# Patient Record
Sex: MALE | URBAN - METROPOLITAN AREA
[De-identification: names, ages, dates, MRNs, and addresses within clinical notes are randomized per-mention and may not be internally consistent; named-entity substitution may affect disease eponyms.]

---

## 2020-01-17 ENCOUNTER — OFFICE VISIT (OUTPATIENT)
Dept: FAMILY MEDICINE CLINIC | Age: 65
End: 2020-01-17

## 2020-01-17 VITALS
HEART RATE: 85 BPM | RESPIRATION RATE: 18 BRPM | WEIGHT: 249 LBS | DIASTOLIC BLOOD PRESSURE: 88 MMHG | TEMPERATURE: 97.2 F | SYSTOLIC BLOOD PRESSURE: 148 MMHG | BODY MASS INDEX: 30.96 KG/M2 | HEIGHT: 75 IN | OXYGEN SATURATION: 96 %

## 2020-01-17 DIAGNOSIS — I10 ESSENTIAL HYPERTENSION: ICD-10-CM

## 2020-01-17 DIAGNOSIS — E78.2 MIXED HYPERLIPIDEMIA: ICD-10-CM

## 2020-01-17 DIAGNOSIS — J45.20 MILD INTERMITTENT ASTHMA WITHOUT COMPLICATION: Primary | ICD-10-CM

## 2020-01-17 DIAGNOSIS — R01.1 MURMUR, CARDIAC: ICD-10-CM

## 2020-01-17 DIAGNOSIS — E66.9 OBESITY (BMI 30-39.9): ICD-10-CM

## 2020-01-17 RX ORDER — FLUTICASONE PROPIONATE 50 MCG
SPRAY, SUSPENSION (ML) NASAL
Qty: 3 BOTTLE | Refills: 3 | Status: SHIPPED | OUTPATIENT
Start: 2020-01-17 | End: 2021-03-04 | Stop reason: SDUPTHER

## 2020-01-17 NOTE — PATIENT INSTRUCTIONS
Please call [facility] at [phone] if they do not  Call you by Monday to schedule the heart ultrasound test. 
  
 
 
 
DASH Diet: Care Instructions Your Care Instructions The DASH diet is an eating plan that can help lower your blood pressure. DASH stands for Dietary Approaches to Stop Hypertension. Hypertension is high blood pressure. The DASH diet focuses on eating foods that are high in calcium, potassium, and magnesium. These nutrients can lower blood pressure. The foods that are highest in these nutrients are fruits, vegetables, low-fat dairy products, nuts, seeds, and legumes. But taking calcium, potassium, and magnesium supplements instead of eating foods that are high in those nutrients does not have the same effect. The DASH diet also includes whole grains, fish, and poultry. The DASH diet is one of several lifestyle changes your doctor may recommend to lower your high blood pressure. Your doctor may also want you to decrease the amount of sodium in your diet. Lowering sodium while following the DASH diet can lower blood pressure even further than just the DASH diet alone. Follow-up care is a key part of your treatment and safety. Be sure to make and go to all appointments, and call your doctor if you are having problems. It's also a good idea to know your test results and keep a list of the medicines you take. How can you care for yourself at home? Following the DASH diet · Eat 4 to 5 servings of fruit each day. A serving is 1 medium-sized piece of fruit, ½ cup chopped or canned fruit, 1/4 cup dried fruit, or 4 ounces (½ cup) of fruit juice. Choose fruit more often than fruit juice. · Eat 4 to 5 servings of vegetables each day. A serving is 1 cup of lettuce or raw leafy vegetables, ½ cup of chopped or cooked vegetables, or 4 ounces (½ cup) of vegetable juice. Choose vegetables more often than vegetable juice. · Get 2 to 3 servings of low-fat and fat-free dairy each day.  A serving is 8 ounces of milk, 1 cup of yogurt, or 1 ½ ounces of cheese. · Eat 6 to 8 servings of grains each day. A serving is 1 slice of bread, 1 ounce of dry cereal, or ½ cup of cooked rice, pasta, or cooked cereal. Try to choose whole-grain products as much as possible. · Limit lean meat, poultry, and fish to 2 servings each day. A serving is 3 ounces, about the size of a deck of cards. · Eat 4 to 5 servings of nuts, seeds, and legumes (cooked dried beans, lentils, and split peas) each week. A serving is 1/3 cup of nuts, 2 tablespoons of seeds, or ½ cup of cooked beans or peas. · Limit fats and oils to 2 to 3 servings each day. A serving is 1 teaspoon of vegetable oil or 2 tablespoons of salad dressing. · Limit sweets and added sugars to 5 servings or less a week. A serving is 1 tablespoon jelly or jam, ½ cup sorbet, or 1 cup of lemonade. · Eat less than 2,300 milligrams (mg) of sodium a day. If you limit your sodium to 1,500 mg a day, you can lower your blood pressure even more. Tips for success · Start small. Do not try to make dramatic changes to your diet all at once. You might feel that you are missing out on your favorite foods and then be more likely to not follow the plan. Make small changes, and stick with them. Once those changes become habit, add a few more changes. · Try some of the following: ? Make it a goal to eat a fruit or vegetable at every meal and at snacks. This will make it easy to get the recommended amount of fruits and vegetables each day. ? Try yogurt topped with fruit and nuts for a snack or healthy dessert. ? Add lettuce, tomato, cucumber, and onion to sandwiches. ? Combine a ready-made pizza crust with low-fat mozzarella cheese and lots of vegetable toppings. Try using tomatoes, squash, spinach, broccoli, carrots, cauliflower, and onions. ? Have a variety of cut-up vegetables with a low-fat dip as an appetizer instead of chips and dip. ? Sprinkle sunflower seeds or chopped almonds over salads. Or try adding chopped walnuts or almonds to cooked vegetables. ? Try some vegetarian meals using beans and peas. Add garbanzo or kidney beans to salads. Make burritos and tacos with mashed nguyễn beans or black beans. Where can you learn more? Go to http://jo ann-gavin.info/. Enter P468 in the search box to learn more about \"DASH Diet: Care Instructions. \" Current as of: April 9, 2019 Content Version: 12.2 © 7579-0194 [employer]. Care instructions adapted under license by [employer] (which disclaims liability or warranty for this information). If you have questions about a medical condition or this instruction, always ask your healthcare professional. [address] any warranty or liability for your use of this information.

## 2020-01-17 NOTE — PROGRESS NOTES
Kayce Antonio is a 59 y.o.  male and presents with New Patient       Subjective:    Asthma- seeing Dr. Nyla Rios  Hyperlipidemia- on simvastatin- watches diet. No myalagia or abd pain.  htn-taking all meds- no h/o MI/renal injury or CVA. Obesity- gained wt on steroids. Pt is currently losing weight. Assessment/Plan:    Asthma- seeing Dr. Nyla Rios with [facility]- continue f/u with pulmonary  Hyperlipidemia-cont statin- 6/3/19.   htn- mildly elevated- will hold on changing meds- discussed lifestyle and wt loss. Elevated PSA and nodule- keep urology f/u. Obesity- encouraged wt loss with caloric restriction to BMI under 25 at 1-2 lbs per week. Colon polyp- due for next colo in 2021-last was 8/6/15- Dr. Samm Dinaa. Allergies- needs refill of flonase- needs this year round. Discussed potential steroid effects. Diagnoses and all orders for this visit:    1. Mild intermittent asthma without complication  Comments:  seeing pulmonary- Dr. Nyla Rios    2. Mixed hyperlipidemia    3. Essential hypertension    4. Murmur, cardiac  -     ECHO ADULT COMPLETE; Future    5. Obesity (BMI 30-39. 9)    Other orders  -     fluticasone propionate (FLONASE) 50 mcg/actuation nasal spray; 2 sprays each nostril daily          ROS:  Constitutional: No recent weight change. No weakness/fatigue. No f/c. Skin: No rashes, change in nails/hair, itching   HENT: No HA, dizziness. No hearing loss/tinnitus. No nasal congestion/discharge. Eyes: No change in vision, double/blurred vision or eye pain/redness. Cardiovascular: No CP/palpitations. No LOBO/orthopnea/PND. Respiratory: No cough/sputum, dyspnea, wheezing. Gastointestinal: No dysphagia, reflux. No n/v. No constipation/diarrhea. No melena/rectal bleeding. Genitourinary: No dysuria, urinary hesitancy, nocturia, hematuria. No incontinence. Musculoskeletal: No joint pain/stiffness. No muscle pain/tenderness. Endo: No heat/cold intolerance, no polyuria/polydypsia. Heme: No h/o anemia. No easy bleeding/bruising. Allergy/Immunology: No seasonal rhinitis. Denies frequent colds, sinus/ear infections. Neurological: No seizures/numbness/weakness. No paresthesias. Psychiatric:  No depression, anxiety. PMH:  Past Medical History:   Diagnosis Date    Allergy     Asthma     Burning with urination     Colon polyp     Elevated PSA     Essential hypertension     Hypertension     Obesity, unspecified     Prostate nodule        Patient Active Problem List   Diagnosis Code    Elevated prostate specific antigen (PSA) R97.20    Nocturia R35.1    Prostate nodule N40.2    Epididymo-orchitis N45.3    Mild intermittent asthma without complication U77.84    Mixed hyperlipidemia E78.2       PSH:  Past Surgical History:   Procedure Laterality Date    HX OTHER SURGICAL  2011    Prostate Biopsy- Dr. Teresa Lemon  ? Prostate biopsy- Dr. Sorin Hilliard        SH:  Social History     Tobacco Use    Smoking status: Never Smoker    Smokeless tobacco: Never Used   Substance Use Topics    Alcohol use: Not Currently     Frequency: Never    Drug use: No       FH:  Family History   Problem Relation Age of Onset    Heart Disease Mother     Diabetes Father     Asthma Sister        Medications/Allergies:    Current Outpatient Medications:     albuterol (PROAIR HFA) 90 mcg/actuation inhaler, Take 180 mcg by inhalation daily. , Disp: , Rfl:     tiZANidine (ZANAFLEX) 2 mg tablet, as needed. , Disp: , Rfl: 1    tamsulosin (FLOMAX) 0.4 mg capsule, Take 1 Cap by mouth daily (after dinner). , Disp: 90 Cap, Rfl: 3    dutasteride (AVODART) 0.5 mg capsule, Take 1 Cap by mouth daily (after dinner). , Disp: 90 Cap, Rfl: 3    fluticasone (FLONASE) 50 mcg/actuation nasal spray, , Disp: , Rfl: 0    zolpidem (AMBIEN) 5 mg tablet, Take 5 mg by mouth nightly as needed. , Disp: , Rfl: 0    amLODIPine (NORVASC) 5 mg tablet, Take 5 mg by mouth daily.   , Disp: , Rfl:     Azelastine (ASTEPRO) 0.15 % (205.5 mcg) nasal spray, two (2) times a day.  , Disp: , Rfl:     Calcium-Cholecalciferol, D3, 500 mg(1,250mg) -400 unit Tab, Take  by mouth daily. , Disp: , Rfl:     hydrochlorothiazide (HYDRODIURIL) 25 mg tablet, Take 25 mg by mouth daily. , Disp: , Rfl:     omeprazole (PRILOSEC) 20 mg capsule, Take 20 mg by mouth daily. , Disp: , Rfl:     simvastatin (ZOCOR) 10 mg tablet, Take  by mouth nightly.  , Disp: , Rfl:     tiotropium (SPIRIVA WITH HANDIHALER) 18 mcg inhalation capsule, Take 1 Cap by inhalation daily. , Disp: , Rfl:     montelukast (SINGULAIR) 10 mg tablet, Take 10 mg by mouth daily. , Disp: , Rfl:     Allergies   Allergen Reactions    Ace Inhibitors Swelling    Lisinopril Swelling       Objective:  Visit Vitals  /88   Pulse 85   Temp 97.2 °F (36.2 °C) (Oral)   Resp 18   Ht 6' 3\" (1.905 m)   Wt 249 lb (112.9 kg)   SpO2 96%   BMI 31.12 kg/m²    Body mass index is 31.12 kg/m². Constitutional: Well developed, nourished, no distress, alert   HENT: Exterior ears and tympanic membranes normal bilaterally. Supple neck. No thyromegaly or lymphadenopathy. Oropharynx clear and moist mucous membranes. Eyes: Conjunctiva normal. PERRL. Cardiovascular: S1, S2.  RRR. No murmurs/rubs. No thrills palpated. No carotid bruits. Intact distal pulses. No edema. Pulmonary/Chest Wall: No abnormalities on inspection. Clear to auscultation bilaterally. No wheezing/rhonchi. Normal effort. GI: Soft, nontender, nondistended. Normal active bowel sounds. No  masses on palpation. No hepatosplenomegaly. Musculoskeletal: Gait normal.  Joints without deformity/tenderness. Strength intact bilateral upper and lower ext. Normal ROM all extremities. Neurological: Appropriate. 2+DTR. No focal motor or sensory deficits. Speech normal.   Skin: No lesions/rashes on inspection. Psych: Appropriate affect, judgement and insight. Short-term memory intact.                Health Maintenance:   Health Maintenance   Topic Date Due    Hepatitis C Screening  1955    FOBT Q 1 YEAR AGE 50-75  03/19/2005    Shingrix Vaccine Age 50> (2 of 2) 11/05/2018    Influenza Age 5 to Adult  08/01/2019    DTaP/Tdap/Td series (2 - Td) 09/10/2028    Pneumococcal 0-64 years  Aged Out       No orders of the defined types were placed in this encounter.

## 2020-04-01 DIAGNOSIS — R01.1 MURMUR, CARDIAC: ICD-10-CM

## 2020-05-27 RX ORDER — AMLODIPINE BESYLATE 5 MG/1
5 TABLET ORAL DAILY
Qty: 90 TAB | Refills: 1 | Status: SHIPPED | OUTPATIENT
Start: 2020-05-27 | End: 2020-11-04

## 2020-05-27 RX ORDER — HYDROCHLOROTHIAZIDE 25 MG/1
25 TABLET ORAL DAILY
Qty: 90 TAB | Refills: 1 | Status: SHIPPED | OUTPATIENT
Start: 2020-05-27 | End: 2020-11-04

## 2020-05-27 RX ORDER — OMEPRAZOLE 20 MG/1
20 CAPSULE, DELAYED RELEASE ORAL DAILY
Qty: 90 CAP | Refills: 1 | Status: SHIPPED | OUTPATIENT
Start: 2020-05-27 | End: 2020-11-04

## 2020-06-03 RX ORDER — FLUTICASONE PROPIONATE AND SALMETEROL 500; 50 UG/1; UG/1
1 POWDER RESPIRATORY (INHALATION) EVERY 12 HOURS
Qty: 1 INHALER | Refills: 2 | Status: SHIPPED | OUTPATIENT
Start: 2020-06-03 | End: 2020-09-09

## 2020-06-03 RX ORDER — AZELASTINE HCL 205.5 UG/1
1 SPRAY NASAL 2 TIMES DAILY
Qty: 1 BOTTLE | Refills: 2 | Status: SHIPPED | OUTPATIENT
Start: 2020-06-03 | End: 2021-07-22 | Stop reason: SDUPTHER

## 2020-06-03 RX ORDER — MONTELUKAST SODIUM 10 MG/1
10 TABLET ORAL DAILY
Qty: 30 TAB | Refills: 2 | Status: SHIPPED | OUTPATIENT
Start: 2020-06-03

## 2020-07-15 ENCOUNTER — VIRTUAL VISIT (OUTPATIENT)
Dept: FAMILY MEDICINE CLINIC | Age: 65
End: 2020-07-15

## 2020-07-15 DIAGNOSIS — Z91.199 NO-SHOW FOR APPOINTMENT: Primary | ICD-10-CM

## 2020-07-20 ENCOUNTER — VIRTUAL VISIT (OUTPATIENT)
Dept: FAMILY MEDICINE CLINIC | Age: 65
End: 2020-07-20

## 2020-07-20 DIAGNOSIS — J45.20 MILD INTERMITTENT ASTHMA WITHOUT COMPLICATION: Primary | ICD-10-CM

## 2020-07-20 RX ORDER — ALBUTEROL SULFATE 90 UG/1
2 AEROSOL, METERED RESPIRATORY (INHALATION) DAILY
Qty: 1 INHALER | Refills: 2 | Status: CANCELLED | OUTPATIENT
Start: 2020-07-20

## 2020-07-20 RX ORDER — SIMVASTATIN 10 MG/1
10 TABLET, FILM COATED ORAL
Qty: 90 TAB | Refills: 2 | Status: CANCELLED | OUTPATIENT
Start: 2020-07-20

## 2020-07-20 NOTE — PROGRESS NOTES
1. Have you been to the ER, urgent care clinic since your last visit? Hospitalized since your last visit? No.     2. Have you seen or consulted any other health care providers outside of the [address] [place] [address] since your last visit? Include any pap smears or colon screening.  No.    Chief Complaint   Patient presents with   Nick Philippe Cholesterol Problem

## 2020-08-12 RX ORDER — SIMVASTATIN 10 MG/1
TABLET, FILM COATED ORAL
OUTPATIENT
Start: 2020-08-12

## 2020-08-12 NOTE — TELEPHONE ENCOUNTER
Requested Prescriptions     Pending Prescriptions Disp Refills    simvastatin (ZOCOR) 10 mg tablet        Sig: Take  by mouth nightly.

## 2020-09-09 RX ORDER — FLUTICASONE PROPIONATE AND SALMETEROL 500; 50 UG/1; UG/1
POWDER RESPIRATORY (INHALATION)
Qty: 60 EACH | Refills: 0 | Status: SHIPPED | OUTPATIENT
Start: 2020-09-09

## 2020-10-12 ENCOUNTER — VIRTUAL VISIT (OUTPATIENT)
Dept: FAMILY MEDICINE CLINIC | Age: 65
End: 2020-10-12

## 2020-10-12 DIAGNOSIS — R97.20 ELEVATED PROSTATE SPECIFIC ANTIGEN (PSA): ICD-10-CM

## 2020-10-12 DIAGNOSIS — J45.20 MILD INTERMITTENT ASTHMA WITHOUT COMPLICATION: Primary | ICD-10-CM

## 2020-10-12 DIAGNOSIS — E78.2 MIXED HYPERLIPIDEMIA: ICD-10-CM

## 2020-10-12 DIAGNOSIS — E66.9 OBESITY (BMI 30-39.9): ICD-10-CM

## 2020-10-12 DIAGNOSIS — I10 ESSENTIAL HYPERTENSION: ICD-10-CM

## 2020-10-12 PROCEDURE — 99443 PR PHYS/QHP TELEPHONE EVALUATION 21-30 MIN: CPT | Performed by: INTERNAL MEDICINE

## 2020-10-12 NOTE — PROGRESS NOTES
1. Have you been to the ER, urgent care clinic since your last visit? Hospitalized since your last visit? No    2. Have you seen or consulted any other health care providers outside of the [address] [place] [address] since your last visit? Include any pap smears or colon screening.  No     Chief Complaint   Patient presents with    Follow Up Chronic Condition    Hypertension    Asthma    Cholesterol Problem

## 2020-10-12 NOTE — PROGRESS NOTES
Anupama Frias is a 72 y.o. male, evaluated via audio-only technology on 10/12/2020 for Follow Up Chronic Condition; Hypertension; Asthma; and Cholesterol Problem  . Assessment & Plan:   Diagnoses and all orders for this visit:    1. Mild intermittent asthma without complication  -     CBC W/O DIFF; Future    2. Mixed hyperlipidemia  -     METABOLIC PANEL, COMPREHENSIVE; Future  -     LIPID PANEL; Future  -     TSH 3RD GENERATION; Future    3. Essential hypertension  -     CBC W/O DIFF; Future    4. Obesity (BMI 30-39.9)  -     CBC W/O DIFF; Future    5. Elevated prostate specific antigen (PSA)         Asthma- seeing Dr. Marcelo Eid with [facility]- continue f/u with pulmonary  Hyperlipidemia-cont statin- 6/3/19. On simvastatin.  htn- mildly elevated- will hold on changing meds- discussed lifestyle and wt loss. Elevated PSA and nodule- keep urology f/u. Obesity- encouraged wt loss with caloric restriction to BMI under 25 at 1-2 lbs per week. Colon polyp- due for next colo in 2021-last was 8/6/15- Dr. Bhakti Lopez. Allergies- needs refill of flonase- needs this year round. Discussed potential steroid effects. Subjective:     Asthma- seeing Dr. Marcelo Eid- stable. Hyperlipidemia- on simvastatin- watches diet. No myalagia or abd pain. Htn-157/83-taking all meds- no h/o MI/renal injury or CVA. Obesity- gained wt- up to . Pt is currently losing weight. Elevated PSA - seeing urology. rtc 1 months- with labs. Pneumovax and vitals. Prior to Admission medications    Medication Sig Start Date End Date Taking? Authorizing Provider   Wixela Inhub 500-50 mcg/dose diskus inhaler INHALE 1 PUFF BY MOUTH EVERY 12 HOURS 9/9/20  Yes Yuliet Quispe MD   tiotropium (Spiriva with HandiHaler) 18 mcg inhalation capsule Take 1 Cap by inhalation daily. 6/3/20  Yes Yuliet Quispe MD   montelukast (Singulair) 10 mg tablet Take 1 Tab by mouth daily.  6/3/20  Yes Yuliet Quispe MD   azelastine (Astepro) 0.15 % (205.5 mcg) 1 [place] by Both Nostrils route two (2) times a day. 6/3/20  Yes Nicol Crowley MD   omeprazole (PRILOSEC) 20 mg capsule Take 1 Cap by mouth daily. 5/27/20  Yes Nicol Crowley MD   hydroCHLOROthiazide (HYDRODIURIL) 25 mg tablet Take 1 Tab by mouth daily. 5/27/20  Yes Nicol Crowley MD   amLODIPine (NORVASC) 5 mg tablet Take 1 Tab by mouth daily. 5/27/20  Yes Nicol Crowley MD   fluticasone propionate [facility]) 50 mcg/actuation nasal spray 2 sprays each nostril daily 1/17/20  Yes Nicol Crowley MD   albuterol [facility]) 90 mcg/actuation inhaler Take 180 mcg by inhalation daily. Yes Provider, Historical   tiZANidine (ZANAFLEX) 2 mg tablet as needed. 9/23/19  Yes Provider, Historical   tamsulosin (FLOMAX) 0.4 mg capsule Take 1 Cap by mouth daily (after dinner). 10/31/19  Yes Ingrid Cherry MD   dutasteride (AVODART) 0.5 mg capsule Take 1 Cap by mouth daily (after dinner). 10/31/19  Yes Ingrid Cherry MD   zolpidem (AMBIEN) 5 mg tablet Take 5 mg by mouth nightly as needed. 5/2/16  Yes Provider, Historical   Calcium-Cholecalciferol, D3, 500 mg(1,250mg) -400 unit Tab Take  by mouth daily. Yes Provider, Historical   simvastatin (ZOCOR) 10 mg tablet Take  by mouth nightly.      Yes Provider, Historical     Patient Active Problem List   Diagnosis Code    Elevated prostate specific antigen (PSA) R97.20    Nocturia R35.1    Prostate nodule N40.2    Epididymo-orchitis N45.3    Mild intermittent asthma without complication W04.17    Mixed hyperlipidemia E78.2     Patient Active Problem List    Diagnosis Date Noted    Mild intermittent asthma without complication 57/69/8115    Epididymo-orchitis 06/21/2016    Nocturia 10/17/2013    Prostate nodule 10/17/2013    Elevated prostate specific antigen (PSA) 10/15/2013    Mixed hyperlipidemia 07/26/2012     Current Outpatient Medications   Medication Sig Dispense Refill    Wixela Inhub 500-50 mcg/dose diskus inhaler INHALE 1 PUFF BY MOUTH EVERY 12 HOURS 60 Each 0    tiotropium (Spiriva with HandiHaler) 18 mcg inhalation capsule Take 1 Cap by inhalation daily. 30 Cap 2    montelukast (Singulair) 10 mg tablet Take 1 Tab by mouth daily. 30 Tab 2    azelastine (Astepro) 0.15 % (205.5 mcg) 1 Spray by Both Nostrils route two (2) times a day. 1 Bottle 2    omeprazole (PRILOSEC) 20 mg capsule Take 1 Cap by mouth daily. 90 Cap 1    hydroCHLOROthiazide (HYDRODIURIL) 25 mg tablet Take 1 Tab by mouth daily. 90 Tab 1    amLODIPine (NORVASC) 5 mg tablet Take 1 Tab by mouth daily. 90 Tab 1    fluticasone propionate (FLONASE) 50 mcg/actuation nasal spray 2 sprays each nostril daily 3 Bottle 3    albuterol (PROAIR HFA) 90 mcg/actuation inhaler Take 180 mcg by inhalation daily.  tiZANidine (ZANAFLEX) 2 mg tablet as needed. 1    tamsulosin (FLOMAX) 0.4 mg capsule Take 1 Cap by mouth daily (after dinner). 90 Cap 3    dutasteride (AVODART) 0.5 mg capsule Take 1 Cap by mouth daily (after dinner). 90 Cap 3    zolpidem (AMBIEN) 5 mg tablet Take 5 mg by mouth nightly as needed. 0    Calcium-Cholecalciferol, D3, 500 mg(1,250mg) -400 unit Tab Take  by mouth daily.  simvastatin (ZOCOR) 10 mg tablet Take  by mouth nightly. Allergies   Allergen Reactions    Ace Inhibitors Swelling    Lisinopril Swelling     Past Medical History:   Diagnosis Date    Allergy     Asthma     Burning with urination     Colon polyp     Elevated PSA     Essential hypertension     Hypertension     Obesity, unspecified     Prostate nodule      Past Surgical History:   Procedure Laterality Date    HX OTHER SURGICAL  2011    Prostate Biopsy- Dr. Lucy Mccormack  ?     Prostate biopsy- Dr. Dona Hicks     Family History   Problem Relation Age of Onset    Heart Disease Mother     Diabetes Father     Asthma Sister      Social History     Tobacco Use    Smoking status: Never Smoker    Smokeless tobacco: Never Used   Substance Use Topics    Alcohol use: Not Currently     Frequency: Never       ROS  Cardiac- no chest pain or palpitations  Pulmonary- no sob or wheezes  GI- no n/v or diarrhea. No flowsheet data found. Ata Blount, who was evaluated through a patient-initiated, synchronous (real-time) audio only encounter, and/or her healthcare decision maker, is aware that it is a billable service, with coverage as determined by his insurance carrier. He provided verbal consent to proceed: Yes. He has not had a related appointment within my department in the past 7 days or scheduled within the next 24 hours.       Total Time: minutes: 21-30 minutes    Kenn Neumann MD

## 2020-11-04 RX ORDER — AMLODIPINE BESYLATE 5 MG/1
TABLET ORAL
Qty: 90 TAB | Refills: 1 | Status: SHIPPED | OUTPATIENT
Start: 2020-11-04 | End: 2021-06-08 | Stop reason: SDUPTHER

## 2020-11-04 RX ORDER — HYDROCHLOROTHIAZIDE 25 MG/1
TABLET ORAL
Qty: 90 TAB | Refills: 1 | Status: SHIPPED | OUTPATIENT
Start: 2020-11-04 | End: 2021-05-26 | Stop reason: SDUPTHER

## 2020-11-04 RX ORDER — OMEPRAZOLE 20 MG/1
CAPSULE, DELAYED RELEASE ORAL
Qty: 90 CAP | Refills: 1 | Status: SHIPPED | OUTPATIENT
Start: 2020-11-04 | End: 2021-06-08 | Stop reason: SDUPTHER

## 2020-11-24 RX ORDER — SIMVASTATIN 10 MG/1
10 TABLET, FILM COATED ORAL
Qty: 90 TAB | Refills: 3 | Status: SHIPPED | OUTPATIENT
Start: 2020-11-24 | End: 2021-12-06 | Stop reason: SDUPTHER

## 2021-03-04 NOTE — TELEPHONE ENCOUNTER
Former Dr. Liset Galvin patient    Samm Jackie sent medication refill request. Patient does not have an appointment to establish care with another provider in this office. Please contact patient to schedule an appointment.       Appointment to establish care with you is on Next Appt  5/6/21 - Stefan Acosta MD - [facility]

## 2021-03-08 RX ORDER — FLUTICASONE PROPIONATE 50 MCG
SPRAY, SUSPENSION (ML) NASAL
Qty: 1 BOTTLE | Refills: 1 | Status: SHIPPED | OUTPATIENT
Start: 2021-03-08 | End: 2021-03-08

## 2021-05-07 RX ORDER — FLUTICASONE PROPIONATE 50 MCG
SPRAY, SUSPENSION (ML) NASAL
Qty: 16 G | Refills: 1 | Status: SHIPPED | OUTPATIENT
Start: 2021-05-07 | End: 2021-07-19

## 2021-05-27 RX ORDER — HYDROCHLOROTHIAZIDE 25 MG/1
TABLET ORAL
Qty: 90 TABLET | Refills: 1 | Status: SHIPPED | OUTPATIENT
Start: 2021-05-27 | End: 2021-12-03 | Stop reason: SDUPTHER

## 2021-06-14 RX ORDER — OMEPRAZOLE 20 MG/1
CAPSULE, DELAYED RELEASE ORAL
Qty: 90 CAPSULE | Refills: 1 | Status: SHIPPED | OUTPATIENT
Start: 2021-06-14 | End: 2021-12-06 | Stop reason: SDUPTHER

## 2021-06-14 RX ORDER — AMLODIPINE BESYLATE 5 MG/1
TABLET ORAL
Qty: 90 TABLET | Refills: 1 | Status: SHIPPED | OUTPATIENT
Start: 2021-06-14 | End: 2021-12-06 | Stop reason: SDUPTHER

## 2021-07-08 ENCOUNTER — OFFICE VISIT (OUTPATIENT)
Dept: FAMILY MEDICINE CLINIC | Age: 66
End: 2021-07-08
Payer: MEDICAID

## 2021-07-08 ENCOUNTER — HOSPITAL ENCOUNTER (OUTPATIENT)
Dept: LAB | Age: 66
Discharge: HOME | End: 2021-07-08
Payer: MEDICAID

## 2021-07-08 VITALS
TEMPERATURE: 98.4 F | HEART RATE: 85 BPM | RESPIRATION RATE: 18 BRPM | OXYGEN SATURATION: 94 % | WEIGHT: 249.8 LBS | DIASTOLIC BLOOD PRESSURE: 82 MMHG | HEIGHT: 75 IN | BODY MASS INDEX: 31.06 KG/M2 | SYSTOLIC BLOOD PRESSURE: 151 MMHG

## 2021-07-08 DIAGNOSIS — E78.2 MIXED HYPERLIPIDEMIA: ICD-10-CM

## 2021-07-08 DIAGNOSIS — Z13.39 SCREENING FOR ALCOHOLISM: ICD-10-CM

## 2021-07-08 DIAGNOSIS — R35.1 NOCTURIA: ICD-10-CM

## 2021-07-08 DIAGNOSIS — R97.20 ELEVATED PROSTATE SPECIFIC ANTIGEN (PSA): ICD-10-CM

## 2021-07-08 DIAGNOSIS — J45.20 MILD INTERMITTENT ASTHMA WITHOUT COMPLICATION: ICD-10-CM

## 2021-07-08 DIAGNOSIS — M54.9 CHRONIC MIDLINE BACK PAIN, UNSPECIFIED BACK LOCATION: Primary | ICD-10-CM

## 2021-07-08 DIAGNOSIS — Z12.11 SCREENING FOR COLON CANCER: ICD-10-CM

## 2021-07-08 DIAGNOSIS — G89.29 CHRONIC MIDLINE BACK PAIN, UNSPECIFIED BACK LOCATION: Primary | ICD-10-CM

## 2021-07-08 DIAGNOSIS — Z71.89 ADVANCED DIRECTIVES, COUNSELING/DISCUSSION: ICD-10-CM

## 2021-07-08 LAB
ALBUMIN SERPL-MCNC: 3.9 G/DL (ref 3.4–5)
ALBUMIN/GLOB SERPL: 1 {RATIO} (ref 0.8–1.7)
ALP SERPL-CCNC: 110 U/L (ref 45–117)
ALT SERPL-CCNC: 25 U/L (ref 16–61)
ANION GAP SERPL CALC-SCNC: 2 MMOL/L (ref 3–18)
AST SERPL-CCNC: 22 U/L (ref 10–38)
BILIRUB SERPL-MCNC: 1.6 MG/DL (ref 0.2–1)
BUN SERPL-MCNC: 17 MG/DL (ref 7–18)
BUN/CREAT SERPL: 15 (ref 12–20)
CALCIUM SERPL-MCNC: 8.9 MG/DL (ref 8.5–10.1)
CHLORIDE SERPL-SCNC: 103 MMOL/L (ref 100–111)
CHOLEST SERPL-MCNC: 176 MG/DL
CO2 SERPL-SCNC: 34 MMOL/L (ref 21–32)
CREAT SERPL-MCNC: 1.1 MG/DL (ref 0.6–1.3)
GLOBULIN SER CALC-MCNC: 3.8 G/DL (ref 2–4)
GLUCOSE SERPL-MCNC: 89 MG/DL (ref 74–99)
HDLC SERPL-MCNC: 67 MG/DL (ref 40–60)
HDLC SERPL: 2.6 {RATIO} (ref 0–5)
LDLC SERPL CALC-MCNC: 94.6 MG/DL (ref 0–100)
LIPID PROFILE,FLP: ABNORMAL
POTASSIUM SERPL-SCNC: 3.4 MMOL/L (ref 3.5–5.5)
PROT SERPL-MCNC: 7.7 G/DL (ref 6.4–8.2)
SODIUM SERPL-SCNC: 139 MMOL/L (ref 136–145)
TRIGL SERPL-MCNC: 72 MG/DL (ref ?–150)
VLDLC SERPL CALC-MCNC: 14.4 MG/DL

## 2021-07-08 PROCEDURE — 36415 COLL VENOUS BLD VENIPUNCTURE: CPT

## 2021-07-08 PROCEDURE — 80061 LIPID PANEL: CPT

## 2021-07-08 PROCEDURE — G0438 PPPS, INITIAL VISIT: HCPCS | Performed by: NURSE PRACTITIONER

## 2021-07-08 PROCEDURE — 99213 OFFICE O/P EST LOW 20 MIN: CPT | Performed by: NURSE PRACTITIONER

## 2021-07-08 PROCEDURE — 80053 COMPREHEN METABOLIC PANEL: CPT

## 2021-07-08 RX ORDER — TIZANIDINE 2 MG/1
2 TABLET ORAL AS NEEDED
Qty: 30 TABLET | Refills: 2 | Status: SHIPPED | OUTPATIENT
Start: 2021-07-08 | End: 2022-08-08 | Stop reason: SDUPTHER

## 2021-07-08 NOTE — PATIENT INSTRUCTIONS
Medicare Wellness Visit, Male    The best way to live healthy is to have a lifestyle where you eat a well-balanced diet, exercise regularly, limit alcohol use, and quit all forms of tobacco/nicotine, if applicable. Regular preventive services are another way to keep healthy. Preventive services (vaccines, screening tests, monitoring & exams) can help personalize your care plan, which helps you manage your own care. Screening tests can find health problems at the earliest stages, when they are easiest to treat. [facility] follows the current, evidence-based guidelines published by the [facility] ([facility]) when recommending preventive services for our patients. Because we follow these guidelines, sometimes recommendations change over time as research supports it. (For example, a prostate screening blood test is no longer routinely recommended for men with no symptoms). Of course, you and your doctor may decide to screen more often for some diseases, based on your risk and co-morbidities (chronic disease you are already diagnosed with). Preventive services for you include:  - Medicare offers their members a free annual wellness visit, which is time for you and your primary care provider to discuss and plan for your preventive service needs. Take advantage of this benefit every year!  -All adults over age 72 should receive the recommended pneumonia vaccines. Current USPSTF guidelines recommend a series of two vaccines for the best pneumonia protection.   -All adults should have a flu vaccine yearly and tetanus vaccine every 10 years.  -All adults age 48 and older should receive the shingles vaccines (series of two vaccines).        -All adults age 38-68 who are overweight should have a diabetes screening test once every three years.   -Other screening tests & preventive services for persons with diabetes include: an eye exam to screen for diabetic retinopathy, a kidney function test, a foot exam, and stricter control over your cholesterol.   -Cardiovascular screening for adults with routine risk involves an electrocardiogram (ECG) at intervals determined by the provider.   -Colorectal cancer screening should be done for adults age 54-65 with no increased risk factors for colorectal cancer. There are a number of acceptable methods of screening for this type of cancer. Each test has its own benefits and drawbacks. Discuss with your provider what is most appropriate for you during your annual wellness visit. The different tests include: colonoscopy (considered the best screening method), a fecal occult blood test, a fecal DNA test, and sigmoidoscopy.  -All adults born between [facility] should be screened once for Hepatitis C.  -An Abdominal Aortic Aneurysm (AAA) Screening is recommended for men age 73-68 who has ever smoked in their lifetime.      Here is a list of your current Health Maintenance items (your personalized list of preventive services) with a due date:  Health Maintenance Due   Topic Date Due    Colorectal Screening  Never done    Pneumococcal Vaccine (1 of 1 - PPSV23) 03/19/2020    Cholesterol Test   06/03/2020

## 2021-07-08 NOTE — PROGRESS NOTES
HISTORY OF PRESENT ILLNESS  Simone Copeland is a 77 y.o. male. Patient states has intermittent leg swelling. Pt just had refills completed. Ophthalmologist 2019   Colonoscopy 2015; due this year but pt is unsure    Review of Systems   Constitutional: Negative. Negative for chills, fever and malaise/fatigue. HENT: Negative. Eyes: Negative. Negative for blurred vision and double vision. Respiratory: Negative for cough, hemoptysis, sputum production, shortness of breath and wheezing. Cardiovascular: Positive for leg swelling (dependent). Negative for chest pain and palpitations. Gastrointestinal: Negative. Negative for blood in stool, constipation, diarrhea, heartburn, nausea and vomiting. Genitourinary: Negative. Negative for frequency, hematuria and urgency. Musculoskeletal: Negative. Negative for back pain, joint pain, myalgias and neck pain. Skin: Negative. Negative for itching and rash. Neurological: Negative. Negative for dizziness, seizures, loss of consciousness and headaches. Endo/Heme/Allergies: Negative. Psychiatric/Behavioral: Negative. Negative for depression, memory loss and suicidal ideas. The patient is not nervous/anxious and does not have insomnia. Physical Exam  Constitutional:       General: He is not in acute distress. Appearance: He is obese. He is not toxic-appearing. HENT:      Mouth/Throat:      Mouth: Mucous membranes are moist.   Eyes:      Pupils: Pupils are equal, round, and reactive to light. Neck:      Vascular: No carotid bruit. Cardiovascular:      Rate and Rhythm: Normal rate and regular rhythm. Pulses: Normal pulses. Heart sounds: Normal heart sounds. No murmur heard. Pulmonary:      Effort: Pulmonary effort is normal. No respiratory distress. Breath sounds: Normal breath sounds. No wheezing. Abdominal:      General: Abdomen is flat. Bowel sounds are normal. There is no distension.       Palpations: Abdomen is soft. There is no mass. Tenderness: There is no guarding. Hernia: No hernia is present. Musculoskeletal:      Cervical back: Normal range of motion. Right lower leg: Edema present. Left lower leg: Edema present. Lymphadenopathy:      Cervical: No cervical adenopathy. Skin:     General: Skin is warm and dry. Capillary Refill: Capillary refill takes less than 2 seconds. Findings: No erythema. Neurological:      General: No focal deficit present. Mental Status: He is alert and oriented to person, place, and time. Psychiatric:         Mood and Affect: Mood normal.         Behavior: Behavior normal.       ASSESSMENT and PLAN    ICD-10-CM ICD-9-CM    1. Chronic midline back pain, unspecified back location  M54.9 724.5 tiZANidine (ZANAFLEX) 2 mg tablet    G89.29 338.29    2. Elevated prostate specific antigen (PSA)  R97.20 790.93    3. Mild intermittent asthma without complication  J96.30 843.93    4. Mixed hyperlipidemia  E78.2 272.2 LIPID PANEL      METABOLIC PANEL, COMPREHENSIVE   5. Nocturia  R35.1 788.43    6. Advanced directives, counseling/discussion  Z71.89 V65.49    7. Screening for alcoholism  Z13.39 V79.1 NM ANNUAL ALCOHOL SCREEN 15 MIN   8. Screening for colon cancer  Z12.11 V76.51      Follow-up and Dispositions    · Return in about 3 months (around 10/8/2021). 1. Chronic midline lower back pain  --Zanaflex PRN    2. Elevated PSA  --continue urology follow up  --pt states gets PSA checked at urology    3. Mild intermittent asthma  --continue Wixela inhaler daily/Spiriva  --continue Albuterol inhaler PRN  --continue pulmonary follow up  --continue Singulair    4. Mixed hyperlipidemia  --lipid panel  --CMP  --continue Zocor 10 mg     8.  Screening for colon cancer  --pt states will follow up for colonoscopy  --refuses colonoscopy or cologuard referral today    current treatment plan is effective, no change in therapy  lab results and schedule of future lab studies reviewed with patient  reviewed diet, exercise and weight control  cardiovascular risk and specific lipid/LDL goals reviewed  reviewed medications and side effects in detail  This is the Subsequent Medicare Annual Wellness Exam, performed 12 months or more after the Initial AWV or the last Subsequent AWV    I have reviewed the patient's medical history in detail and updated the computerized patient record. Assessment/Plan   Education and counseling provided:  Are appropriate based on today's review and evaluation  Pneumococcal Vaccine  Prostate cancer screening tests (PSA, covered annually)  Colorectal cancer screening tests  Screening for glaucoma    1. Chronic midline back pain, unspecified back location  -     tiZANidine (ZANAFLEX) 2 mg tablet; Take 1 Tablet by mouth as needed for Muscle Spasm(s). , Normal, Disp-30 Tablet, R-2  2. Elevated prostate specific antigen (PSA)  3. Mild intermittent asthma without complication  4. Mixed hyperlipidemia  -     LIPID PANEL; Future  -     METABOLIC PANEL, COMPREHENSIVE; Future  5. Nocturia  6. Advanced directives, counseling/discussion  7. Screening for alcoholism  -     MO ANNUAL ALCOHOL SCREEN 15 MIN  8. Screening for colon cancer       Depression Risk Factor Screening     3 most recent PHQ Screens 7/8/2021   Little interest or pleasure in doing things Not at all   Feeling down, depressed, irritable, or hopeless Not at all   Total Score PHQ 2 0       Alcohol Risk Screen    Do you average more than 1 drink per night or more than 7 drinks a week: No    In the past three months have you have had more than 4 drinks containing alcohol on one occasion: No        Functional Ability and Level of Safety    Hearing: Hearing is good. Activities of Daily Living: The home contains: no safety equipment. Patient does total self care      Ambulation: with mild difficulty     Fall Risk:  Fall Risk Assessment, last 12 mths 7/8/2021   Able to walk?  Yes   Fall in past 15 months? 0   Do you feel unsteady? 0      Abuse Screen:  Patient is not abused       Cognitive Screening    Has your family/caregiver stated any concerns about your memory: no     Cognitive Screening: Normal - Clock Drawing Test    Health Maintenance Due     Health Maintenance Due   Topic Date Due    Colorectal Cancer Screening Combo  Never done    Pneumococcal 65+ years (1 of 1 - PPSV23) 03/19/2020       Patient Care Team   Patient Care Team:  Floresita Dias MD as PCP - General (Internal Medicine)    History     Patient Active Problem List   Diagnosis Code    Elevated prostate specific antigen (PSA) R97.20    Nocturia R35.1    Prostate nodule N40.2    Epididymo-orchitis N45.3    Mild intermittent asthma without complication W17.75    Mixed hyperlipidemia E78.2    Chronic rhinitis J31.0    LOBO (dyspnea on exertion) R06.00    Essential hypertension, benign I10    Sepsis ([address] A41.9    Severe persistent asthma without complication J45.24     Past Medical History:   Diagnosis Date    Allergy     Asthma     Burning with urination     Colon polyp     Elevated PSA     Essential hypertension     Hypertension     Obesity, unspecified     Prostate nodule       Past Surgical History:   Procedure Laterality Date    HX OTHER SURGICAL  2011    Prostate Biopsy- Dr. Annamaria Johnson  ? Prostate biopsy- Dr. Wendall Lesch     Current Outpatient Medications   Medication Sig Dispense Refill    tiZANidine (ZANAFLEX) 2 mg tablet Take 1 Tablet by mouth as needed for Muscle Spasm(s).  30 Tablet 2    omeprazole (PRILOSEC) 20 mg capsule TAKE 1 CAPSULE BY MOUTH DAILY 90 Capsule 1    amLODIPine (NORVASC) 5 mg tablet TAKE 1 TABLET BY MOUTH DAILY 90 Tablet 1    hydroCHLOROthiazide (HYDRODIURIL) 25 mg tablet TAKE 1 TABLET BY MOUTH DAILY 90 Tablet 1    fluticasone propionate (FLONASE) 50 mcg/actuation nasal spray SHAKE LIQUID AND USE 2 SPRAYS IN EACH NOSTRIL DAILY 16 g 1    tamsulosin (FLOMAX) 0.4 mg capsule TAKE 1 CAPSULE BY MOUTH DAILY AFTER DINNER 90 Cap 3    simvastatin (ZOCOR) 10 mg tablet Take 1 Tab by mouth nightly. 90 Tab 3    Wixela Inhub 500-50 mcg/dose diskus inhaler INHALE 1 PUFF BY MOUTH EVERY 12 HOURS 60 Each 0    tiotropium (Spiriva with HandiHaler) 18 mcg inhalation capsule Take 1 Cap by inhalation daily. 30 Cap 2    montelukast (Singulair) 10 mg tablet Take 1 Tab by mouth daily. 30 Tab 2    azelastine (Astepro) 0.15 % (205.5 mcg) 1 Spray by Both Nostrils route two (2) times a day. 1 Bottle 2    albuterol (PROAIR HFA) 90 mcg/actuation inhaler Take 180 mcg by inhalation daily.  zolpidem (AMBIEN) 5 mg tablet Take 5 mg by mouth nightly as needed. 0    Calcium-Cholecalciferol, D3, 500 mg(1,250mg) -400 unit Tab Take  by mouth daily.  potassium chloride (KLOR-CON) 10 mEq tablet Take 1 Tablet by mouth daily for 3 days. Indications: low amount of potassium in the blood 3 Tablet 0    dutasteride (AVODART) 0.5 mg capsule Take 1 Cap by mouth daily (after dinner).  (Patient not taking: Reported on 7/8/2021) 90 Cap 3     Allergies   Allergen Reactions    Ace Inhibitors Swelling    Lisinopril Swelling       Family History   Problem Relation Age of Onset    Heart Disease Mother     Diabetes Father     Asthma Sister      Social History     Tobacco Use    Smoking status: Never Smoker    Smokeless tobacco: Never Used   Substance Use Topics    Alcohol use: Not Currently         Majr Linder NP

## 2021-07-08 NOTE — PROGRESS NOTES
Eli Walter presents today for   Chief Complaint   Patient presents with   [address]       Is someone accompanying this pt? no    Is the patient using any DME equipment during OV? no    Depression Screening:  3 most recent PHQ Screens 7/8/2021   Little interest or pleasure in doing things Not at all   Feeling down, depressed, irritable, or hopeless Not at all   Total Score PHQ 2 0       Learning Assessment:  Learning Assessment 7/8/2021   PRIMARY LEARNER Patient   HIGHEST LEVEL OF EDUCATION - PRIMARY LEARNER  4 YEARS OF COLLEGE   BARRIERS PRIMARY LEARNER NONE   CO-LEARNER CAREGIVER No   PRIMARY LANGUAGE ENGLISH   LEARNER PREFERENCE PRIMARY READING     -   ANSWERED BY patient   RELATIONSHIP SELF       Fall Risk  Fall Risk Assessment, last 12 mths 7/8/2021   Able to walk? Yes   Fall in past 12 months? 0   Do you feel unsteady? 0       ADL  No flowsheet data found. Health Maintenance reviewed and discussed and ordered per Provider. Health Maintenance Due   Topic Date Due    COVID-19 Vaccine (1) Never done    Colorectal Cancer Screening Combo  Never done    Shingrix Vaccine Age 50> (2 of 2) 11/05/2018    Pneumococcal 65+ years (1 of 1 - PPSV23) 03/19/2020    Lipid Screen  06/03/2020    Medicare Yearly Exam  Never done   . Coordination of Care:  1. Have you been to the ER, urgent care clinic since your last visit? Hospitalized since your last visit? no    2. Have you seen or consulted any other health care providers outside of the [address] [place] [address] since your last visit? Include any pap smears or colon screening.  No    Health Maintenance Due   Topic Date Due    COVID-19 Vaccine (1) Never done    Colorectal Cancer Screening Combo  Never done    Shingrix Vaccine Age 50> (2 of 2) 11/05/2018    Pneumococcal 65+ years (1 of 1 - PPSV23) 03/19/2020    Lipid Screen  06/03/2020    Medicare Yearly Exam  Never done

## 2021-07-09 DIAGNOSIS — E87.6 HYPOKALEMIA: Primary | ICD-10-CM

## 2021-07-09 PROBLEM — J31.0 CHRONIC RHINITIS: Status: ACTIVE | Noted: 2019-04-29

## 2021-07-09 PROBLEM — J45.50 SEVERE PERSISTENT ASTHMA WITHOUT COMPLICATION: Status: ACTIVE | Noted: 2021-07-09

## 2021-07-09 PROBLEM — R06.09 DOE (DYSPNEA ON EXERTION): Status: ACTIVE | Noted: 2021-07-09

## 2021-07-09 RX ORDER — POTASSIUM CHLORIDE 750 MG/1
10 TABLET, EXTENDED RELEASE ORAL DAILY
Qty: 3 TABLET | Refills: 0 | Status: SHIPPED | OUTPATIENT
Start: 2021-07-09 | End: 2021-07-12

## 2021-07-19 RX ORDER — FLUTICASONE PROPIONATE 50 MCG
SPRAY, SUSPENSION (ML) NASAL
Qty: 16 G | Refills: 1 | Status: SHIPPED | OUTPATIENT
Start: 2021-07-19 | End: 2022-03-17 | Stop reason: SDUPTHER

## 2021-07-22 RX ORDER — AZELASTINE HCL 205.5 UG/1
1 SPRAY NASAL 2 TIMES DAILY
Qty: 1 BOTTLE | Refills: 2 | Status: SHIPPED | OUTPATIENT
Start: 2021-07-22 | End: 2021-12-16 | Stop reason: SDUPTHER

## 2021-12-03 RX ORDER — HYDROCHLOROTHIAZIDE 25 MG/1
TABLET ORAL
Qty: 90 TABLET | Refills: 1 | Status: SHIPPED | OUTPATIENT
Start: 2021-12-03 | End: 2021-12-16 | Stop reason: SDUPTHER

## 2021-12-06 RX ORDER — AMLODIPINE BESYLATE 5 MG/1
TABLET ORAL
Qty: 90 TABLET | Refills: 1 | Status: SHIPPED | OUTPATIENT
Start: 2021-12-06 | End: 2021-12-16 | Stop reason: SDUPTHER

## 2021-12-06 RX ORDER — SIMVASTATIN 10 MG/1
10 TABLET, FILM COATED ORAL
Qty: 90 TABLET | Refills: 3 | Status: SHIPPED | OUTPATIENT
Start: 2021-12-06 | End: 2021-12-16 | Stop reason: SDUPTHER

## 2021-12-06 RX ORDER — OMEPRAZOLE 20 MG/1
CAPSULE, DELAYED RELEASE ORAL
Qty: 90 CAPSULE | Refills: 1 | Status: SHIPPED | OUTPATIENT
Start: 2021-12-06 | End: 2021-12-16 | Stop reason: SDUPTHER

## 2021-12-06 NOTE — TELEPHONE ENCOUNTER
----- Message from [place] [facility] sent at 12/6/2021  3:33 PM EST -----  Subject: Message to Provider    QUESTIONS  Information for Provider? PT STATES HIS MED REFILLS GOT DENIED. HE DOES   HAVE AN APPOINTMENT SCHEDULED ON 12/16. AMLODIPINE, OMEPRAZOLE, AND   SIMVASTATIN WAS ALL DENIED BY OFFICE. HE IS COMPLETELY OUT OF SIMVASTATIN   - PT STATES PRIOR APT IN OCT WAS CANCELLED PER THE OFFICE, AND R/S TO   12/16. PLEASE ADVISE.   ---------------------------------------------------------------------------  --------------  CALL BACK INFO  What is the best way for the office to contact you? OK to leave message on   voicemail  Preferred Call Back Phone Number? [phone]  ---------------------------------------------------------------------------  --------------  SCRIPT ANSWERS  Relationship to Patient?  Self

## 2021-12-16 ENCOUNTER — HOSPITAL ENCOUNTER (OUTPATIENT)
Dept: LAB | Age: 66
Discharge: HOME | End: 2021-12-16
Payer: MEDICAID

## 2021-12-16 ENCOUNTER — OFFICE VISIT (OUTPATIENT)
Dept: FAMILY MEDICINE CLINIC | Age: 66
End: 2021-12-16
Payer: MEDICAID

## 2021-12-16 VITALS
DIASTOLIC BLOOD PRESSURE: 94 MMHG | BODY MASS INDEX: 31.65 KG/M2 | OXYGEN SATURATION: 97 % | SYSTOLIC BLOOD PRESSURE: 170 MMHG | WEIGHT: 254.6 LBS | HEART RATE: 99 BPM | RESPIRATION RATE: 18 BRPM | TEMPERATURE: 98.4 F | HEIGHT: 75 IN

## 2021-12-16 DIAGNOSIS — M54.9 CHRONIC MIDLINE BACK PAIN, UNSPECIFIED BACK LOCATION: ICD-10-CM

## 2021-12-16 DIAGNOSIS — R97.20 ELEVATED PROSTATE SPECIFIC ANTIGEN (PSA): ICD-10-CM

## 2021-12-16 DIAGNOSIS — Z13.0 SCREENING FOR DEFICIENCY ANEMIA: ICD-10-CM

## 2021-12-16 DIAGNOSIS — E78.2 MIXED HYPERLIPIDEMIA: Primary | ICD-10-CM

## 2021-12-16 DIAGNOSIS — G89.29 CHRONIC MIDLINE BACK PAIN, UNSPECIFIED BACK LOCATION: ICD-10-CM

## 2021-12-16 DIAGNOSIS — Z12.11 COLON CANCER SCREENING: ICD-10-CM

## 2021-12-16 DIAGNOSIS — N13.8 BPH WITH OBSTRUCTION/LOWER URINARY TRACT SYMPTOMS: ICD-10-CM

## 2021-12-16 DIAGNOSIS — N40.1 BPH WITH OBSTRUCTION/LOWER URINARY TRACT SYMPTOMS: ICD-10-CM

## 2021-12-16 DIAGNOSIS — I10 ESSENTIAL HYPERTENSION: ICD-10-CM

## 2021-12-16 DIAGNOSIS — R30.0 DYSURIA: ICD-10-CM

## 2021-12-16 DIAGNOSIS — R97.20 ELEVATED PSA: ICD-10-CM

## 2021-12-16 DIAGNOSIS — T78.40XS ALLERGY, SEQUELA: ICD-10-CM

## 2021-12-16 DIAGNOSIS — E78.2 MIXED HYPERLIPIDEMIA: ICD-10-CM

## 2021-12-16 DIAGNOSIS — K21.9 GASTROESOPHAGEAL REFLUX DISEASE WITHOUT ESOPHAGITIS: ICD-10-CM

## 2021-12-16 LAB
ALBUMIN SERPL-MCNC: 4 G/DL (ref 3.4–5)
ALBUMIN/GLOB SERPL: 1.1 {RATIO} (ref 0.8–1.7)
ALP SERPL-CCNC: 110 U/L (ref 45–117)
ALT SERPL-CCNC: 27 U/L (ref 16–61)
ANION GAP SERPL CALC-SCNC: 7 MMOL/L (ref 3–18)
AST SERPL-CCNC: 18 U/L (ref 10–38)
BASOPHILS # BLD: 0 K/UL (ref 0–0.1)
BASOPHILS NFR BLD: 0 % (ref 0–2)
BILIRUB SERPL-MCNC: 1.3 MG/DL (ref 0.2–1)
BILIRUB UR QL STRIP: NEGATIVE
BUN SERPL-MCNC: 22 MG/DL (ref 7–18)
BUN/CREAT SERPL: 19 (ref 12–20)
CALCIUM SERPL-MCNC: 9.4 MG/DL (ref 8.5–10.1)
CHLORIDE SERPL-SCNC: 102 MMOL/L (ref 100–111)
CHOLEST SERPL-MCNC: 194 MG/DL
CO2 SERPL-SCNC: 27 MMOL/L (ref 21–32)
CREAT SERPL-MCNC: 1.14 MG/DL (ref 0.6–1.3)
DIFFERENTIAL METHOD BLD: ABNORMAL
EOSINOPHIL # BLD: 0 K/UL (ref 0–0.4)
EOSINOPHIL NFR BLD: 0 % (ref 0–5)
ERYTHROCYTE [DISTWIDTH] IN BLOOD BY AUTOMATED COUNT: 12.5 % (ref 11.6–14.5)
GLOBULIN SER CALC-MCNC: 3.8 G/DL (ref 2–4)
GLUCOSE SERPL-MCNC: 119 MG/DL (ref 74–99)
GLUCOSE UR-MCNC: NEGATIVE MG/DL
HCT VFR BLD AUTO: 41.5 % (ref 36–48)
HDLC SERPL-MCNC: 67 MG/DL (ref 40–60)
HDLC SERPL: 2.9 {RATIO} (ref 0–5)
HGB BLD-MCNC: 14.1 G/DL (ref 13–16)
IMM GRANULOCYTES # BLD AUTO: 0.1 K/UL (ref 0–0.04)
IMM GRANULOCYTES NFR BLD AUTO: 1 % (ref 0–0.5)
KETONES P FAST UR STRIP-MCNC: NEGATIVE MG/DL
LDLC SERPL CALC-MCNC: 111 MG/DL (ref 0–100)
LIPID PROFILE,FLP: ABNORMAL
LYMPHOCYTES # BLD: 1.4 K/UL (ref 0.9–3.6)
LYMPHOCYTES NFR BLD: 14 % (ref 21–52)
MCH RBC QN AUTO: 29.7 PG (ref 24–34)
MCHC RBC AUTO-ENTMCNC: 34 G/DL (ref 31–37)
MCV RBC AUTO: 87.6 FL (ref 78–100)
MONOCYTES # BLD: 0.4 K/UL (ref 0.05–1.2)
MONOCYTES NFR BLD: 4 % (ref 3–10)
NEUTS SEG # BLD: 8.4 K/UL (ref 1.8–8)
NEUTS SEG NFR BLD: 82 % (ref 40–73)
NRBC # BLD: 0 K/UL (ref 0–0.01)
NRBC BLD-RTO: 0 PER 100 WBC
PH UR STRIP: 6 [PH] (ref 4.6–8)
PLATELET # BLD AUTO: 308 K/UL (ref 135–420)
PMV BLD AUTO: 10.1 FL (ref 9.2–11.8)
POTASSIUM SERPL-SCNC: 3.4 MMOL/L (ref 3.5–5.5)
PROT SERPL-MCNC: 7.8 G/DL (ref 6.4–8.2)
PROT UR QL STRIP: NEGATIVE
RBC # BLD AUTO: 4.74 M/UL (ref 4.35–5.65)
SODIUM SERPL-SCNC: 136 MMOL/L (ref 136–145)
SP GR UR STRIP: 1.02 (ref 1–1.03)
TRIGL SERPL-MCNC: 80 MG/DL (ref ?–150)
UA UROBILINOGEN AMB POC: NORMAL (ref 0.2–1)
URINALYSIS CLARITY POC: CLEAR
URINALYSIS COLOR POC: YELLOW
URINE BLOOD POC: NEGATIVE
URINE LEUKOCYTES POC: NEGATIVE
URINE NITRITES POC: NEGATIVE
VLDLC SERPL CALC-MCNC: 16 MG/DL
WBC # BLD AUTO: 10.3 K/UL (ref 4.6–13.2)

## 2021-12-16 PROCEDURE — 85025 COMPLETE CBC W/AUTO DIFF WBC: CPT

## 2021-12-16 PROCEDURE — 81001 URINALYSIS AUTO W/SCOPE: CPT | Performed by: NURSE PRACTITIONER

## 2021-12-16 PROCEDURE — 80053 COMPREHEN METABOLIC PANEL: CPT

## 2021-12-16 PROCEDURE — 80061 LIPID PANEL: CPT

## 2021-12-16 PROCEDURE — 36415 COLL VENOUS BLD VENIPUNCTURE: CPT

## 2021-12-16 PROCEDURE — 99214 OFFICE O/P EST MOD 30 MIN: CPT | Performed by: NURSE PRACTITIONER

## 2021-12-16 RX ORDER — TIZANIDINE 2 MG/1
2 TABLET ORAL AS NEEDED
Qty: 30 TABLET | Refills: 2 | Status: CANCELLED | OUTPATIENT
Start: 2021-12-16

## 2021-12-16 RX ORDER — SIMVASTATIN 10 MG/1
10 TABLET, FILM COATED ORAL
Qty: 90 TABLET | Refills: 3 | Status: SHIPPED | OUTPATIENT
Start: 2021-12-16

## 2021-12-16 RX ORDER — OMEPRAZOLE 20 MG/1
CAPSULE, DELAYED RELEASE ORAL
Qty: 90 CAPSULE | Refills: 1 | Status: SHIPPED | OUTPATIENT
Start: 2021-12-16

## 2021-12-16 RX ORDER — AMLODIPINE BESYLATE 5 MG/1
TABLET ORAL
Qty: 90 TABLET | Refills: 1 | Status: SHIPPED | OUTPATIENT
Start: 2021-12-16 | End: 2022-03-17

## 2021-12-16 RX ORDER — ZOLPIDEM TARTRATE 5 MG/1
5 TABLET ORAL
Refills: 0 | Status: CANCELLED | OUTPATIENT
Start: 2021-12-16

## 2021-12-16 RX ORDER — AZELASTINE HCL 205.5 UG/1
1 SPRAY NASAL 2 TIMES DAILY
Qty: 1 EACH | Refills: 2 | Status: SHIPPED | OUTPATIENT
Start: 2021-12-16 | End: 2022-04-13 | Stop reason: SDUPTHER

## 2021-12-16 RX ORDER — HYDROCHLOROTHIAZIDE 25 MG/1
TABLET ORAL
Qty: 90 TABLET | Refills: 1 | Status: SHIPPED | OUTPATIENT
Start: 2021-12-16

## 2021-12-16 RX ORDER — FLUTICASONE PROPIONATE 50 MCG
SPRAY, SUSPENSION (ML) NASAL
Qty: 16 G | Refills: 1 | Status: CANCELLED | OUTPATIENT
Start: 2021-12-16

## 2021-12-16 NOTE — PROGRESS NOTES
Patient will like to wait a lil while for the Colonoscopy. Shivam Epps presents today for   Chief Complaint   Patient presents with    Follow-up     3 month       Is someone accompanying this pt? no    Is the patient using any DME equipment during OV? no    Depression Screening:  3 most recent PHQ Screens 12/16/2021   Little interest or pleasure in doing things Not at all   Feeling down, depressed, irritable, or hopeless Not at all   Total Score PHQ 2 0       Learning Assessment:  Learning Assessment 7/8/2021   PRIMARY LEARNER Patient   HIGHEST LEVEL OF EDUCATION - PRIMARY LEARNER  4 YEARS OF COLLEGE   BARRIERS PRIMARY LEARNER NONE   CO-LEARNER CAREGIVER No   PRIMARY LANGUAGE ENGLISH   LEARNER PREFERENCE PRIMARY READING     -   ANSWERED BY patient   RELATIONSHIP SELF       Fall Risk  Fall Risk Assessment, last 12 mths 12/16/2021   Able to walk? Yes   Fall in past 12 months? 0   Do you feel unsteady? 0   Are you worried about falling 0       ADL  No flowsheet data found. Health Maintenance reviewed and discussed and ordered per Provider. Health Maintenance Due   Topic Date Due    Colorectal Cancer Screening Combo  Never done    Shingrix Vaccine Age 50> (2 of 2) 11/05/2018    Pneumococcal 65+ years (1 of 1 - PPSV23) 03/19/2020   . Coordination of Care:  1. Have you been to the ER, urgent care clinic since your last visit? Hospitalized since your last visit? no    2. Have you seen or consulted any other health care providers outside of the [address] [place] [address] since your last visit? Include any pap smears or colon screening.  no

## 2021-12-16 NOTE — PROGRESS NOTES
The Gopal Tarango 77 y.o. male presents today for:    Chief Complaint   Patient presents with    Follow-up     3 month     Patient states having urinary itching inside the urethra. Patient has had urosepsis. Urology 1/12 and 1/19    Review of Systems   Constitutional: Negative. Negative for chills, fever and malaise/fatigue. HENT: Negative. Eyes: Negative. Respiratory: Negative. Negative for cough, shortness of breath and wheezing. Cardiovascular: Negative. Negative for chest pain and palpitations. Gastrointestinal: Negative. Genitourinary: Positive for frequency and urgency. Musculoskeletal: Negative. Skin: Negative. Neurological: Negative. Psychiatric/Behavioral: Negative. Health Maintenance Due   Topic Date Due    Colorectal Cancer Screening Combo  Never done    Shingrix Vaccine Age 49> (2 of 2) 11/05/2018    Pneumococcal 65+ years (1 of 1 - PPSV23) 03/19/2020        Past Medical History:   Diagnosis Date    Allergy     Asthma     Burning with urination     Colon polyp     Elevated PSA     Essential hypertension     Hypertension     Obesity, unspecified     Prostate nodule        Physical Exam  Cardiovascular:      Rate and Rhythm: Normal rate and regular rhythm. Pulses: Normal pulses. Heart sounds: Normal heart sounds. No murmur heard. Pulmonary:      Effort: Pulmonary effort is normal. No respiratory distress. Breath sounds: Normal breath sounds. No wheezing. Musculoskeletal:      Cervical back: Normal range of motion and neck supple. Neurological:      General: No focal deficit present. Mental Status: He is alert and oriented to person, place, and time.           Visit Vitals  BP (!) 170/94   Pulse 99   Temp 98.4 °F (36.9 °C) (Temporal)   Resp 18   Ht 6' 3\" (1.905 m)   Wt 254 lb 9.6 oz (115.5 kg)   SpO2 97%   BMI 31.82 kg/m²     Current Outpatient Medications on File Prior to Visit   Medication Sig Dispense Refill    fluticasone propionate (FLONASE) 50 mcg/actuation nasal spray SHAKE LIQUID AND USE 2 SPRAYS IN EACH NOSTRIL DAILY 16 g 1    tiZANidine (ZANAFLEX) 2 mg tablet Take 1 Tablet by mouth as needed for Muscle Spasm(s). 30 Tablet 2    tamsulosin (FLOMAX) 0.4 mg capsule TAKE 1 CAPSULE BY MOUTH DAILY AFTER DINNER 90 Cap 3    Wixela Inhub 500-50 mcg/dose diskus inhaler INHALE 1 PUFF BY MOUTH EVERY 12 HOURS 60 Each 0    tiotropium (Spiriva with HandiHaler) 18 mcg inhalation capsule Take 1 Cap by inhalation daily. 30 Cap 2    montelukast (Singulair) 10 mg tablet Take 1 Tab by mouth daily. 30 Tab 2    albuterol (PROAIR HFA) 90 mcg/actuation inhaler Take 180 mcg by inhalation daily.  zolpidem (AMBIEN) 5 mg tablet Take 5 mg by mouth nightly as needed. 0    Calcium-Cholecalciferol, D3, 500 mg(1,250mg) -400 unit Tab Take  by mouth daily. No current facility-administered medications on file prior to visit. ASSESSMENT and PLAN    ICD-10-CM ICD-9-CM    1. Mixed hyperlipidemia  E78.2 272.2 simvastatin (ZOCOR) 10 mg tablet      LIPID PANEL   2. Chronic midline back pain, unspecified back location  M54.9 724.5     G89.29 338.29    3. BPH with obstruction/lower urinary tract symptoms  N40.1 600.01     N13.8 599.69    4. Elevated PSA  R97.20 790.93    5. Essential hypertension  I10 401.9 amLODIPine (NORVASC) 5 mg tablet      hydroCHLOROthiazide (HYDRODIURIL) 25 mg tablet      METABOLIC PANEL, COMPREHENSIVE   6. Elevated prostate specific antigen (PSA)  R97.20 790.93    7. Gastroesophageal reflux disease without esophagitis  K21.9 530.81 omeprazole (PRILOSEC) 20 mg capsule   8. Allergy, sequela  T78.40XS 909.9 azelastine (Astepro) 205.5 mcg (0.15 %)   9. Colon cancer screening  Z12.11 V76.51 REFERRAL TO GASTROENTEROLOGY   10. Screening for deficiency anemia  Z13.0 V78.1 CBC WITH AUTOMATED DIFF   11.  Dysuria  R30.0 788.1 AMB POC URINALYSIS DIP STICK AUTO W/ MICRO     Follow-up and Dispositions    · Return in about 3 months (around 3/16/2022), or if symptoms worsen or fail to improve.       lab results and schedule of future lab studies reviewed with patient  reviewed diet, exercise and weight control  cardiovascular risk and specific lipid/LDL goals reviewed  reviewed medications and side effects in detail    Nestor Day NP

## 2022-03-17 ENCOUNTER — OFFICE VISIT (OUTPATIENT)
Dept: FAMILY MEDICINE CLINIC | Age: 67
End: 2022-03-17
Payer: MEDICAID

## 2022-03-17 ENCOUNTER — HOSPITAL ENCOUNTER (OUTPATIENT)
Dept: LAB | Age: 67
Discharge: HOME | End: 2022-03-17
Payer: MEDICAID

## 2022-03-17 VITALS
TEMPERATURE: 98.2 F | BODY MASS INDEX: 31.9 KG/M2 | HEIGHT: 75 IN | HEART RATE: 82 BPM | WEIGHT: 256.6 LBS | OXYGEN SATURATION: 95 % | DIASTOLIC BLOOD PRESSURE: 83 MMHG | RESPIRATION RATE: 18 BRPM | SYSTOLIC BLOOD PRESSURE: 147 MMHG

## 2022-03-17 DIAGNOSIS — Z86.39 HISTORY OF LOW POTASSIUM: ICD-10-CM

## 2022-03-17 DIAGNOSIS — I10 ESSENTIAL HYPERTENSION: ICD-10-CM

## 2022-03-17 DIAGNOSIS — J45.20 MILD INTERMITTENT ASTHMA WITHOUT COMPLICATION: ICD-10-CM

## 2022-03-17 DIAGNOSIS — T78.40XS ALLERGY, SEQUELA: Primary | ICD-10-CM

## 2022-03-17 LAB
ALBUMIN SERPL-MCNC: 3.9 G/DL (ref 3.4–5)
ALBUMIN/GLOB SERPL: 1.1 {RATIO} (ref 0.8–1.7)
ALP SERPL-CCNC: 100 U/L (ref 45–117)
ALT SERPL-CCNC: 24 U/L (ref 16–61)
ANION GAP SERPL CALC-SCNC: 5 MMOL/L (ref 3–18)
AST SERPL-CCNC: 14 U/L (ref 10–38)
BILIRUB SERPL-MCNC: 1 MG/DL (ref 0.2–1)
BUN SERPL-MCNC: 23 MG/DL (ref 7–18)
BUN/CREAT SERPL: 21 (ref 12–20)
CALCIUM SERPL-MCNC: 9.6 MG/DL (ref 8.5–10.1)
CHLORIDE SERPL-SCNC: 102 MMOL/L (ref 100–111)
CO2 SERPL-SCNC: 30 MMOL/L (ref 21–32)
CREAT SERPL-MCNC: 1.1 MG/DL (ref 0.6–1.3)
GLOBULIN SER CALC-MCNC: 3.5 G/DL (ref 2–4)
GLUCOSE SERPL-MCNC: 94 MG/DL (ref 74–99)
POTASSIUM SERPL-SCNC: 3.7 MMOL/L (ref 3.5–5.5)
PROT SERPL-MCNC: 7.4 G/DL (ref 6.4–8.2)
SODIUM SERPL-SCNC: 137 MMOL/L (ref 136–145)

## 2022-03-17 PROCEDURE — 80053 COMPREHEN METABOLIC PANEL: CPT

## 2022-03-17 PROCEDURE — 36415 COLL VENOUS BLD VENIPUNCTURE: CPT

## 2022-03-17 PROCEDURE — 99214 OFFICE O/P EST MOD 30 MIN: CPT | Performed by: NURSE PRACTITIONER

## 2022-03-17 RX ORDER — PREDNISONE 5 MG/1
TABLET ORAL
Qty: 21 TABLET | Refills: 0 | Status: SHIPPED | OUTPATIENT
Start: 2022-03-17 | End: 2022-08-18 | Stop reason: ALTCHOICE

## 2022-03-17 RX ORDER — FLUTICASONE PROPIONATE 50 MCG
SPRAY, SUSPENSION (ML) NASAL
Qty: 16 G | Refills: 1 | Status: SHIPPED | OUTPATIENT
Start: 2022-03-17 | End: 2022-04-13 | Stop reason: SDUPTHER

## 2022-03-17 RX ORDER — AMLODIPINE BESYLATE 5 MG/1
10 TABLET ORAL DAILY
Qty: 90 TABLET | Refills: 1 | Status: SHIPPED | OUTPATIENT
Start: 2022-03-17 | End: 2022-04-18 | Stop reason: ALTCHOICE

## 2022-03-17 NOTE — PROGRESS NOTES
The Alexa Aw 79 y.o. male presents today for:    Chief Complaint   Patient presents with    Follow-up    Hypertension     BP still elevated; will increase Amlodipine from 5 to 10 mg and recheck in 2 weeks. Pt sees pulmonary once a year. Complains of wheezing; prednisone pack prescribed. Pt has not scheduled colonoscopy yet. Review of Systems   Constitutional: Negative for chills, fever and malaise/fatigue. Respiratory: Positive for cough and wheezing. Negative for hemoptysis, sputum production and shortness of breath. Cardiovascular: Negative for chest pain, palpitations and leg swelling. Health Maintenance Due   Topic Date Due    Colorectal Cancer Screening Combo  Never done    Shingrix Vaccine Age 49> (2 of 2) 11/05/2018    Pneumococcal 65+ years (1 of 1 - PPSV23) 03/19/2020        Past Medical History:   Diagnosis Date    Allergy     Asthma     Burning with urination     Colon polyp     Elevated PSA     Essential hypertension     Hypertension     Obesity, unspecified     Prostate nodule        Physical Exam  Constitutional:       General: He is not in acute distress. Appearance: Normal appearance. He is not toxic-appearing. Cardiovascular:      Rate and Rhythm: Normal rate and regular rhythm. Heart sounds: Normal heart sounds. Pulmonary:      Effort: No respiratory distress. Breath sounds: Wheezing present. Musculoskeletal:         General: Normal range of motion. Right lower leg: No edema. Left lower leg: No edema. Skin:     Capillary Refill: Capillary refill takes less than 2 seconds. Neurological:      General: No focal deficit present. Mental Status: He is alert and oriented to person, place, and time.         Visit Vitals  BP (!) 147/83   Pulse 82   Temp 98.2 °F (36.8 °C) (Temporal)   Resp 18   Ht 6' 3\" (1.905 m)   Wt 256 lb 9.6 oz (116.4 kg)   SpO2 95%   BMI 32.07 kg/m²       Current Outpatient Medications:     fluticasone propionate (FLONASE) 50 mcg/actuation nasal spray, Instill 2 sprays in each nostril daily, Disp: 16 g, Rfl: 1    predniSONE (STERAPRED) 5 mg dose pack, See administration instruction per 5mg dose pack, Disp: 21 Tablet, Rfl: 0    amLODIPine (NORVASC) 5 mg tablet, Take 2 Tablets by mouth daily. TAKE 1 TABLET BY MOUTH DAILY, Disp: 90 Tablet, Rfl: 1    tamsulosin (FLOMAX) 0.4 mg capsule, TAKE 1 CAPSULE BY MOUTH DAILY AFTER DINNER, Disp: 90 Capsule, Rfl: 3    dutasteride (AVODART) 0.5 mg capsule, TAKE 1 CAPSULE BY MOUTH DAILY AFTER DINNER, Disp: 90 Capsule, Rfl: 3    omeprazole (PRILOSEC) 20 mg capsule, TAKE 1 CAPSULE BY MOUTH DAILY, Disp: 90 Capsule, Rfl: 1    simvastatin (ZOCOR) 10 mg tablet, Take 1 Tablet by mouth nightly., Disp: 90 Tablet, Rfl: 3    hydroCHLOROthiazide (HYDRODIURIL) 25 mg tablet, TAKE 1 TABLET BY MOUTH DAILY, Disp: 90 Tablet, Rfl: 1    azelastine (Astepro) 205.5 mcg (0.15 %), 1 [place] by Both Nostrils route two (2) times a day., Disp: 1 Each, Rfl: 2    tiZANidine (ZANAFLEX) 2 mg tablet, Take 1 Tablet by mouth as needed for Muscle Spasm(s). , Disp: 30 Tablet, Rfl: 2    Wixela Inhub 500-50 mcg/dose diskus inhaler, INHALE 1 PUFF BY MOUTH EVERY 12 HOURS, Disp: 60 Each, Rfl: 0    tiotropium (Spiriva with HandiHaler) 18 mcg inhalation capsule, Take 1 Cap by inhalation daily. , Disp: 30 Cap, Rfl: 2    montelukast (Singulair) 10 mg tablet, Take 1 Tab by mouth daily. , Disp: 30 Tab, Rfl: 2    albuterol (PROAIR HFA) 90 mcg/actuation inhaler, Take 180 mcg by inhalation daily. , Disp: , Rfl:     zolpidem (AMBIEN) 5 mg tablet, Take 5 mg by mouth nightly as needed. , Disp: , Rfl: 0    Calcium-Cholecalciferol, D3, 500 mg(1,250mg) -400 unit Tab, Take  by mouth daily. , Disp: , Rfl:     ASSESSMENT and PLAN    ICD-10-CM ICD-9-CM    1. Allergy, sequela  T78.40XS 909.9 fluticasone propionate (FLONASE) 50 mcg/actuation nasal spray   2.  Essential hypertension  I10 401.9 amLODIPine (NORVASC) 5 mg tablet METABOLIC PANEL, COMPREHENSIVE   3. Mild intermittent asthma without complication  H24.97 131.74 predniSONE (STERAPRED) 5 mg dose pack   4. History of low potassium  Q24.38 O41.60 METABOLIC PANEL, COMPREHENSIVE     Follow-up and Dispositions    · Return in about 2 weeks (around 3/31/2022) for BP check .        lab results and schedule of future lab studies reviewed with patient  reviewed diet, exercise and weight control  cardiovascular risk and specific lipid/LDL goals reviewed  reviewed medications and side effects in detail    [employer], NP

## 2022-03-17 NOTE — PROGRESS NOTES
Christopher Osorio presents today for   Chief Complaint   Patient presents with    Follow-up    Hypertension       Is someone accompanying this pt? no    Is the patient using any DME equipment during OV? no    Depression Screening:  3 most recent PHQ Screens 12/16/2021   Little interest or pleasure in doing things Not at all   Feeling down, depressed, irritable, or hopeless Not at all   Total Score PHQ 2 0       Learning Assessment:  Learning Assessment 7/8/2021   PRIMARY LEARNER Patient   HIGHEST LEVEL OF EDUCATION - PRIMARY LEARNER  4 YEARS OF COLLEGE   BARRIERS PRIMARY LEARNER NONE   CO-LEARNER CAREGIVER No   PRIMARY LANGUAGE ENGLISH   LEARNER PREFERENCE PRIMARY READING     -   ANSWERED BY patient   RELATIONSHIP SELF       Fall Risk  Fall Risk Assessment, last 12 mths 12/16/2021   Able to walk? Yes   Fall in past 12 months? 0   Do you feel unsteady? 0   Are you worried about falling 0       ADL  No flowsheet data found. Health Maintenance reviewed and discussed and ordered per Provider. Health Maintenance Due   Topic Date Due    Colorectal Cancer Screening Combo  Never done    Shingrix Vaccine Age 50> (2 of 2) 11/05/2018    Pneumococcal 65+ years (1 of 1 - PPSV23) 03/19/2020   . Coordination of Care:  1. Have you been to the ER, urgent care clinic since your last visit? Hospitalized since your last visit? no    2. Have you seen or consulted any other health care providers outside of the [address] [place] [address] since your last visit? Include any pap smears or colon screening. No      3. For patients aged 39-70: Has the patient had a colonoscopy / FIT/ Cologuard? No Patient states he will do this in 2023. If the patient is female:    4. For patients aged 41-77: Has the patient had a mammogram within the past 2 years? NA - based on age or sex      11. For patients aged 21-65: Has the patient had a pap smear?  NA - based on age or sex

## 2022-03-18 PROBLEM — J31.0 CHRONIC RHINITIS: Status: ACTIVE | Noted: 2019-04-29

## 2022-03-19 PROBLEM — J45.20 MILD INTERMITTENT ASTHMA WITHOUT COMPLICATION: Status: ACTIVE | Noted: 2017-05-14

## 2022-03-19 PROBLEM — J45.50 SEVERE PERSISTENT ASTHMA WITHOUT COMPLICATION: Status: ACTIVE | Noted: 2021-07-09

## 2022-03-19 PROBLEM — R06.09 DOE (DYSPNEA ON EXERTION): Status: ACTIVE | Noted: 2021-07-09

## 2022-04-13 DIAGNOSIS — T78.40XS ALLERGY, SEQUELA: ICD-10-CM

## 2022-04-14 RX ORDER — FLUTICASONE PROPIONATE 50 MCG
SPRAY, SUSPENSION (ML) NASAL
Qty: 16 G | Refills: 1 | Status: SHIPPED | OUTPATIENT
Start: 2022-04-14 | End: 2022-10-12 | Stop reason: SDUPTHER

## 2022-04-14 RX ORDER — AZELASTINE HCL 205.5 UG/1
1 SPRAY NASAL 2 TIMES DAILY
Qty: 1 EACH | Refills: 2 | Status: SHIPPED | OUTPATIENT
Start: 2022-04-14

## 2022-04-18 ENCOUNTER — CLINICAL SUPPORT (OUTPATIENT)
Dept: FAMILY MEDICINE CLINIC | Age: 67
End: 2022-04-18

## 2022-04-18 VITALS
WEIGHT: 255.2 LBS | TEMPERATURE: 98.1 F | HEART RATE: 81 BPM | RESPIRATION RATE: 18 BRPM | BODY MASS INDEX: 31.73 KG/M2 | OXYGEN SATURATION: 95 % | HEIGHT: 75 IN | SYSTOLIC BLOOD PRESSURE: 123 MMHG | DIASTOLIC BLOOD PRESSURE: 72 MMHG

## 2022-04-18 DIAGNOSIS — I10 ESSENTIAL HYPERTENSION: ICD-10-CM

## 2022-04-18 DIAGNOSIS — Z01.30 BP CHECK: Primary | ICD-10-CM

## 2022-04-18 RX ORDER — AMLODIPINE BESYLATE 10 MG/1
10 TABLET ORAL DAILY
Qty: 90 TABLET | Refills: 1 | Status: SHIPPED | OUTPATIENT
Start: 2022-04-18 | End: 2022-10-28 | Stop reason: SDUPTHER

## 2022-04-18 NOTE — PROGRESS NOTES
Per LUANN Clinton instructions patient presents today for a blood pressure check. Patient seated and resting for 15 minutes with both feet flat on the floor. Blood pressure taken and documented. Reported blood pressure to  LUANN Clinton. Patient states he took medication today.

## 2022-08-08 DIAGNOSIS — M54.9 CHRONIC MIDLINE BACK PAIN, UNSPECIFIED BACK LOCATION: ICD-10-CM

## 2022-08-08 DIAGNOSIS — G89.29 CHRONIC MIDLINE BACK PAIN, UNSPECIFIED BACK LOCATION: ICD-10-CM

## 2022-08-08 RX ORDER — TIZANIDINE 2 MG/1
2 TABLET ORAL AS NEEDED
Qty: 30 TABLET | Refills: 2 | Status: SHIPPED | OUTPATIENT
Start: 2022-08-08 | End: 2022-08-18

## 2022-08-18 ENCOUNTER — OFFICE VISIT (OUTPATIENT)
Dept: FAMILY MEDICINE CLINIC | Age: 67
End: 2022-08-18
Payer: MEDICAID

## 2022-08-18 VITALS
RESPIRATION RATE: 16 BRPM | SYSTOLIC BLOOD PRESSURE: 131 MMHG | TEMPERATURE: 98.2 F | HEART RATE: 82 BPM | BODY MASS INDEX: 31.51 KG/M2 | HEIGHT: 75 IN | WEIGHT: 253.4 LBS | OXYGEN SATURATION: 92 % | DIASTOLIC BLOOD PRESSURE: 78 MMHG

## 2022-08-18 DIAGNOSIS — E78.2 MIXED HYPERLIPIDEMIA: ICD-10-CM

## 2022-08-18 DIAGNOSIS — G89.29 CHRONIC MIDLINE BACK PAIN, UNSPECIFIED BACK LOCATION: ICD-10-CM

## 2022-08-18 DIAGNOSIS — Z86.010 HISTORY OF COLON POLYPS: ICD-10-CM

## 2022-08-18 DIAGNOSIS — Z23 ENCOUNTER FOR IMMUNIZATION: ICD-10-CM

## 2022-08-18 DIAGNOSIS — N13.8 BPH WITH OBSTRUCTION/LOWER URINARY TRACT SYMPTOMS: ICD-10-CM

## 2022-08-18 DIAGNOSIS — Z82.3 FAMILY HISTORY OF CEREBROVASCULAR ACCIDENT (CVA): Primary | ICD-10-CM

## 2022-08-18 DIAGNOSIS — M54.9 CHRONIC MIDLINE BACK PAIN, UNSPECIFIED BACK LOCATION: ICD-10-CM

## 2022-08-18 DIAGNOSIS — Z00.00 MEDICARE ANNUAL WELLNESS VISIT, SUBSEQUENT: ICD-10-CM

## 2022-08-18 DIAGNOSIS — I10 ESSENTIAL HYPERTENSION: ICD-10-CM

## 2022-08-18 DIAGNOSIS — J45.20 MILD INTERMITTENT ASTHMA WITHOUT COMPLICATION: ICD-10-CM

## 2022-08-18 DIAGNOSIS — Z86.39 HISTORY OF LOW POTASSIUM: ICD-10-CM

## 2022-08-18 DIAGNOSIS — N40.1 BPH WITH OBSTRUCTION/LOWER URINARY TRACT SYMPTOMS: ICD-10-CM

## 2022-08-18 PROCEDURE — 1123F ACP DISCUSS/DSCN MKR DOCD: CPT | Performed by: NURSE PRACTITIONER

## 2022-08-18 PROCEDURE — G0439 PPPS, SUBSEQ VISIT: HCPCS | Performed by: NURSE PRACTITIONER

## 2022-08-18 RX ORDER — TIZANIDINE 2 MG/1
2 TABLET ORAL AS NEEDED
Qty: 7 TABLET | Refills: 2 | Status: SHIPPED | OUTPATIENT
Start: 2022-08-18

## 2022-08-18 NOTE — PROGRESS NOTES
The Edna Kirby 79 y.o. male presents today for:    Chief Complaint   Patient presents with    Follow-up     4 month    Annual Wellness Visit     Patient seeing urology and pulmonary     Pt refuses colonoscopy at this time. Pt states will schedule colonoscopy next year     Review of Systems   Constitutional:  Negative for chills, fever, malaise/fatigue and weight loss. HENT: Negative. Respiratory:  Positive for shortness of breath. Negative for cough and wheezing. Cardiovascular:  Negative for chest pain, palpitations and leg swelling. Gastrointestinal:  Negative for abdominal pain, blood in stool and constipation. Genitourinary: Negative. Negative for frequency, hematuria and urgency. Musculoskeletal:  Positive for back pain. Negative for falls. Skin: Negative. Neurological:  Positive for sensory change. Negative for headaches. Left arm at night    Psychiatric/Behavioral:  Negative for depression and suicidal ideas. The patient is not nervous/anxious and does not have insomnia. Health Maintenance Due   Topic Date Due    Pneumococcal 65+ years (2 - PCV) 06/21/2013    Shingrix Vaccine Age 50> (2 of 2) 11/05/2018    Colorectal Cancer Screening Combo  08/06/2020        Past Medical History:   Diagnosis Date    Allergy     Asthma     Burning with urination     Colon polyp     Elevated PSA     Essential hypertension     Hypertension     Obesity, unspecified     Prostate nodule        Physical Exam  Constitutional:       General: He is not in acute distress. Appearance: Normal appearance. He is not toxic-appearing. Cardiovascular:      Rate and Rhythm: Normal rate and regular rhythm. Heart sounds: Normal heart sounds. Pulmonary:      Effort: No respiratory distress. Breath sounds: Normal breath sounds. No wheezing. Musculoskeletal:         General: Normal range of motion. Right lower leg: No edema. Left lower leg: No edema.    Skin:     Capillary Refill: Capillary refill takes less than 2 seconds. Neurological:      General: No focal deficit present. Mental Status: He is alert and oriented to person, place, and time. Visit Vitals  /78 (BP 1 Location: Right arm, BP Patient Position: Sitting)   Pulse 82   Temp 98.2 °F (36.8 °C) (Temporal)   Resp 16   Ht 6' 3\" (1.905 m)   Wt 253 lb 6.4 oz (114.9 kg)   SpO2 92%   BMI 31.67 kg/m²       Current Outpatient Medications:     tiZANidine (ZANAFLEX) 2 mg tablet, Take 1 Tablet by mouth as needed for Muscle Spasm(s). , Disp: 7 Tablet, Rfl: 2    tamsulosin (FLOMAX) 0.4 mg capsule, Take 2 Capsules by mouth daily. , Disp: 180 Capsule, Rfl: 3    amLODIPine (NORVASC) 10 mg tablet, Take 1 Tablet by mouth daily. , Disp: 90 Tablet, Rfl: 1    fluticasone propionate (FLONASE) 50 mcg/actuation nasal spray, Instill 2 sprays in each nostril daily, Disp: 16 g, Rfl: 1    azelastine (Astepro) 205.5 mcg (0.15 %), 1 [place] by Both Nostrils route two (2) times a day., Disp: 1 Each, Rfl: 2    dutasteride (AVODART) 0.5 mg capsule, TAKE 1 CAPSULE BY MOUTH DAILY AFTER DINNER, Disp: 90 Capsule, Rfl: 3    omeprazole (PRILOSEC) 20 mg capsule, TAKE 1 CAPSULE BY MOUTH DAILY, Disp: 90 Capsule, Rfl: 1    simvastatin (ZOCOR) 10 mg tablet, Take 1 Tablet by mouth nightly., Disp: 90 Tablet, Rfl: 3    hydroCHLOROthiazide (HYDRODIURIL) 25 mg tablet, TAKE 1 TABLET BY MOUTH DAILY, Disp: 90 Tablet, Rfl: 1    Wixela Inhub 500-50 mcg/dose diskus inhaler, INHALE 1 PUFF BY MOUTH EVERY 12 HOURS, Disp: 60 Each, Rfl: 0    tiotropium (Spiriva with HandiHaler) 18 mcg inhalation capsule, Take 1 Cap by inhalation daily. , Disp: 30 Cap, Rfl: 2    montelukast (Singulair) 10 mg tablet, Take 1 Tab by mouth daily. , Disp: 30 Tab, Rfl: 2    albuterol (PROVENTIL HFA, VENTOLIN HFA, PROAIR HFA) 90 mcg/actuation inhaler, Take 180 mcg by inhalation daily. , Disp: , Rfl:     zolpidem (AMBIEN) 5 mg tablet, Take 5 mg by mouth nightly as needed. , Disp: , Rfl: 0 Calcium-Cholecalciferol, D3, 500 mg(1,250mg) -400 unit Tab, Take  by mouth daily. , Disp: , Rfl:      ASSESSMENT and PLAN    ICD-10-CM ICD-9-CM    1. Family history of cerebrovascular accident (CVA)  Z82.3 V17.1 DUPLEX CAROTID BILATERAL      2. Essential hypertension  I10 401.9       3. Mild intermittent asthma without complication  U46.42 301.05       4. History of low potassium  Z86.39 V12.29       5. BPH with obstruction/lower urinary tract symptoms  N40.1 600.01     N13.8 599.69       6. Mixed hyperlipidemia  E78.2 272.2       7. Chronic midline back pain, unspecified back location  M54.9 724.5 tiZANidine (ZANAFLEX) 2 mg tablet    G89.29 338.29       8. Medicare annual wellness visit, subsequent  Z00.00 V70.0       9. Encounter for immunization  Z23 V03.89 PNEUMOCOCCAL, PCV20, PREVNAR 20, (AGE 18 YRS+), IM, PF      10. History of colon polyps  Z86.010 V12.72         Follow-up and Dispositions    Return in about 4 months (around 12/18/2022) for for physical with fasting labs 1 week prior . lab results and schedule of future lab studies reviewed with patient  reviewed diet, exercise and weight control  cardiovascular risk and specific lipid/LDL goals reviewed  reviewed medications and side effects in detail    Blanca Turner NP This is the Subsequent Medicare Annual Wellness Exam, performed 12 months or more after the Initial AWV or the last Subsequent AWV    I have reviewed the patient's medical history in detail and updated the computerized patient record. Assessment/Plan   Education and counseling provided:  Are appropriate based on today's review and evaluation  Pneumococcal Vaccine  Influenza Vaccine  Prostate cancer screening tests (PSA, covered annually)  Colorectal cancer screening tests  Cardiovascular screening blood test    1. Family history of cerebrovascular accident (CVA)  -     DUPLEX CAROTID BILATERAL; Future  2. Essential hypertension  3.  Mild intermittent asthma without complication  4. History of low potassium  5. BPH with obstruction/lower urinary tract symptoms  6. Mixed hyperlipidemia  7. Chronic midline back pain, unspecified back location  -     tiZANidine (ZANAFLEX) 2 mg tablet; Take 1 Tablet by mouth as needed for Muscle Spasm(s). , Normal, Disp-7 Tablet, R-2  8. Medicare annual wellness visit, subsequent  9. Encounter for immunization  -     PNEUMOCOCCAL, PCV20, PREVNAR 20, (AGE 18 YRS+), IM, PF  10. History of colon polyps       Depression Risk Factor Screening     3 most recent PHQ Screens 8/18/2022   Little interest or pleasure in doing things Not at all   Feeling down, depressed, irritable, or hopeless Not at all   Total Score PHQ 2 0       Alcohol & Drug Abuse Risk Screen    Do you average more than 1 drink per night or more than 7 drinks a week: No    In the past three months have you have had more than 4 drinks containing alcohol on one occasion: No          Functional Ability and Level of Safety    Hearing: Hearing is good. Activities of Daily Living: The home contains: no safety equipment. Patient does total self care      Ambulation: with mild difficulty; uses cane for long distances     Fall Risk:  Fall Risk Assessment, last 12 mths 8/18/2022   Able to walk? Yes   Fall in past 12 months? 0   Do you feel unsteady?  0   Are you worried about falling 0      Abuse Screen:  Patient is not abused       Cognitive Screening    Has your family/caregiver stated any concerns about your memory: no     Cognitive Screening: Normal - Clock Drawing Test    Health Maintenance Due     Health Maintenance Due   Topic Date Due    Pneumococcal 65+ years (2 - PCV) 06/21/2013    Shingrix Vaccine Age 50> (2 of 2) 11/05/2018    Colorectal Cancer Screening Combo  08/06/2020       Patient Care Team   Patient Care Team:  Allyson Hoffman NP as PCP - General (Nurse Practitioner)  Allyson Hoffman NP as PCP - [facility] Empaneled Provider    History     Patient Active Problem List Diagnosis Code    Elevated prostate specific antigen (PSA) R97.20    Nocturia R35.1    Prostate nodule N40.2    Epididymo-orchitis N45.3    Mild intermittent asthma without complication R62.41    Mixed hyperlipidemia E78.2    Chronic rhinitis J31.0    LOBO (dyspnea on exertion) R06.00    Essential hypertension, benign I10    Sepsis (HCC) A41.9    Severe persistent asthma without complication I87.69     Past Medical History:   Diagnosis Date    Allergy     Asthma     Burning with urination     Colon polyp     Elevated PSA     Essential hypertension     Hypertension     Obesity, unspecified     Prostate nodule       Past Surgical History:   Procedure Laterality Date    HX OTHER SURGICAL  2011    Prostate Biopsy- Dr. Brooke Finger  ? Prostate biopsy- Dr. Annalee Padilla     Current Outpatient Medications   Medication Sig Dispense Refill    tiZANidine (ZANAFLEX) 2 mg tablet Take 1 Tablet by mouth as needed for Muscle Spasm(s). 7 Tablet 2    tamsulosin (FLOMAX) 0.4 mg capsule Take 2 Capsules by mouth daily. 180 Capsule 3    amLODIPine (NORVASC) 10 mg tablet Take 1 Tablet by mouth daily. 90 Tablet 1    fluticasone propionate (FLONASE) 50 mcg/actuation nasal spray Instill 2 sprays in each nostril daily 16 g 1    azelastine (Astepro) 205.5 mcg (0.15 %) 1 [place] by Both Nostrils route two (2) times a day. 1 Each 2    dutasteride (AVODART) 0.5 mg capsule TAKE 1 CAPSULE BY MOUTH DAILY AFTER DINNER 90 Capsule 3    omeprazole (PRILOSEC) 20 mg capsule TAKE 1 CAPSULE BY MOUTH DAILY 90 Capsule 1    simvastatin (ZOCOR) 10 mg tablet Take 1 Tablet by mouth nightly. 90 Tablet 3    hydroCHLOROthiazide (HYDRODIURIL) 25 mg tablet TAKE 1 TABLET BY MOUTH DAILY 90 Tablet 1    Wixela Inhub 500-50 mcg/dose diskus inhaler INHALE 1 PUFF BY MOUTH EVERY 12 HOURS 60 Each 0    tiotropium (Spiriva with HandiHaler) 18 mcg inhalation capsule Take 1 Cap by inhalation daily.  30 Cap 2    montelukast (Singulair) 10 mg tablet Take 1 Tab by mouth daily. 30 Tab 2    albuterol (PROVENTIL HFA, VENTOLIN HFA, PROAIR HFA) 90 mcg/actuation inhaler Take 180 mcg by inhalation daily. zolpidem (AMBIEN) 5 mg tablet Take 5 mg by mouth nightly as needed. 0    Calcium-Cholecalciferol, D3, 500 mg(1,250mg) -400 unit Tab Take  by mouth daily.        Allergies   Allergen Reactions    Ace Inhibitors Swelling    Lisinopril Swelling       Family History   Problem Relation Age of Onset    Heart Disease Mother     Diabetes Father     Asthma Sister      Social History     Tobacco Use    Smoking status: Never    Smokeless tobacco: Never   Substance Use Topics    Alcohol use: Not Currently         Earlene Barnes NP

## 2022-08-18 NOTE — PATIENT INSTRUCTIONS
Medicare Wellness Visit, Male    The best way to live healthy is to have a lifestyle where you eat a well-balanced diet, exercise regularly, limit alcohol use, and quit all forms of tobacco/nicotine, if applicable. Regular preventive services are another way to keep healthy. Preventive services (vaccines, screening tests, monitoring & exams) can help personalize your care plan, which helps you manage your own care. Screening tests can find health problems at the earliest stages, when they are easiest to treat. [facility] follows the current, evidence-based guidelines published by the [facility] ([facility]) when recommending preventive services for our patients. Because we follow these guidelines, sometimes recommendations change over time as research supports it. (For example, a prostate screening blood test is no longer routinely recommended for men with no symptoms). Of course, you and your doctor may decide to screen more often for some diseases, based on your risk and co-morbidities (chronic disease you are already diagnosed with). Preventive services for you include:  - Medicare offers their members a free annual wellness visit, which is time for you and your primary care provider to discuss and plan for your preventive service needs. Take advantage of this benefit every year!  -All adults over age 72 should receive the recommended pneumonia vaccines. Current USPSTF guidelines recommend a series of two vaccines for the best pneumonia protection.   -All adults should have a flu vaccine yearly and tetanus vaccine every 10 years.  -All adults age 48 and older should receive the shingles vaccines (series of two vaccines).        -All adults age 38-68 who are overweight should have a diabetes screening test once every three years.   -Other screening tests & preventive services for persons with diabetes include: an eye exam to screen for diabetic retinopathy, a kidney function test, a foot exam, and stricter control over your cholesterol.   -Cardiovascular screening for adults with routine risk involves an electrocardiogram (ECG) at intervals determined by the provider.   -Colorectal cancer screening should be done for adults age 54-65 with no increased risk factors for colorectal cancer. There are a number of acceptable methods of screening for this type of cancer. Each test has its own benefits and drawbacks. Discuss with your provider what is most appropriate for you during your annual wellness visit. The different tests include: colonoscopy (considered the best screening method), a fecal occult blood test, a fecal DNA test, and sigmoidoscopy.  -All adults born between [facility] should be screened once for Hepatitis C.  -An Abdominal Aortic Aneurysm (AAA) Screening is recommended for men age 73-68 who has ever smoked in their lifetime.      Here is a list of your current Health Maintenance items (your personalized list of preventive services) with a due date:  Health Maintenance Due   Topic Date Due    Pneumococcal Vaccine (2 - PCV) 06/21/2013    Shingles Vaccine (2 of 2) 11/05/2018    Colorectal Screening  08/06/2020

## 2022-08-18 NOTE — PROGRESS NOTES
Ed Miller presents today for   Chief Complaint   Patient presents with    Follow-up     4 month    Annual Wellness Visit       Is someone accompanying this pt? no    Is the patient using any DME equipment during OV? no    Depression Screening:  3 most recent PHQ Screens 8/18/2022   Little interest or pleasure in doing things Not at all   Feeling down, depressed, irritable, or hopeless Not at all   Total Score PHQ 2 0       Learning Assessment:  Learning Assessment 7/8/2021   PRIMARY LEARNER Patient   HIGHEST LEVEL OF EDUCATION - PRIMARY LEARNER  4 YEARS OF COLLEGE   BARRIERS PRIMARY LEARNER NONE   CO-LEARNER CAREGIVER No   PRIMARY LANGUAGE ENGLISH   LEARNER PREFERENCE PRIMARY READING     -   ANSWERED BY patient   RELATIONSHIP SELF       Fall Risk  Fall Risk Assessment, last 12 mths 8/18/2022   Able to walk? Yes   Fall in past 12 months? 0   Do you feel unsteady? 0   Are you worried about falling 0       ADL  No flowsheet data found. Health Maintenance reviewed and discussed and ordered per Provider. Health Maintenance Due   Topic Date Due    Pneumococcal 65+ years (2 - PCV) 06/21/2013    Shingrix Vaccine Age 50> (2 of 2) 11/05/2018    Colorectal Cancer Screening Combo  08/06/2020    Medicare Yearly Exam  07/09/2022   . Coordination of Care:  1. Have you been to the ER, urgent care clinic since your last visit? Hospitalized since your last visit? no    2. Have you seen or consulted any other health care providers outside of the [address] [place] [address] since your last visit? Include any pap smears or colon screening. No         3. For patients aged 39-70: Has the patient had a colonoscopy / FIT/ Cologuard? No      If the patient is female:    4. For patients aged 41-77: Has the patient had a mammogram within the past 2 years? NA - based on age or sex      11. For patients aged 21-65: Has the patient had a pap smear?  NA - based on age or sex

## 2022-08-31 PROCEDURE — 90677 PCV20 VACCINE IM: CPT | Performed by: NURSE PRACTITIONER

## 2022-10-07 ENCOUNTER — TELEPHONE (OUTPATIENT)
Dept: FAMILY MEDICINE CLINIC | Age: 67
End: 2022-10-07

## 2022-10-12 DIAGNOSIS — T78.40XS ALLERGY, SEQUELA: ICD-10-CM

## 2022-10-13 RX ORDER — FLUTICASONE PROPIONATE 50 MCG
SPRAY, SUSPENSION (ML) NASAL
Qty: 16 G | Refills: 1 | Status: SHIPPED | OUTPATIENT
Start: 2022-10-13

## 2022-10-28 DIAGNOSIS — I10 ESSENTIAL HYPERTENSION: ICD-10-CM

## 2022-10-28 RX ORDER — AMLODIPINE BESYLATE 10 MG/1
10 TABLET ORAL DAILY
Qty: 90 TABLET | Refills: 1 | Status: SHIPPED | OUTPATIENT
Start: 2022-10-28

## 2022-11-09 DIAGNOSIS — M54.9 CHRONIC MIDLINE BACK PAIN, UNSPECIFIED BACK LOCATION: ICD-10-CM

## 2022-11-09 DIAGNOSIS — G89.29 CHRONIC MIDLINE BACK PAIN, UNSPECIFIED BACK LOCATION: ICD-10-CM

## 2022-11-10 RX ORDER — TIZANIDINE 2 MG/1
2 TABLET ORAL AS NEEDED
Qty: 7 TABLET | Refills: 2 | Status: SHIPPED | OUTPATIENT
Start: 2022-11-10

## 2022-11-16 DIAGNOSIS — T78.40XS ALLERGY, SEQUELA: ICD-10-CM

## 2022-11-16 RX ORDER — FLUTICASONE PROPIONATE 50 MCG
SPRAY, SUSPENSION (ML) NASAL
Qty: 16 G | Refills: 1 | Status: SHIPPED | OUTPATIENT
Start: 2022-11-16

## 2022-11-21 DIAGNOSIS — Z82.3 FAMILY HISTORY OF CEREBROVASCULAR ACCIDENT (CVA): ICD-10-CM

## 2022-11-23 DIAGNOSIS — I10 ESSENTIAL HYPERTENSION: ICD-10-CM

## 2022-11-25 RX ORDER — HYDROCHLOROTHIAZIDE 25 MG/1
TABLET ORAL
Qty: 90 TABLET | Refills: 1 | Status: SHIPPED | OUTPATIENT
Start: 2022-11-25

## 2022-11-25 RX ORDER — HYDROCHLOROTHIAZIDE 25 MG/1
TABLET ORAL
Qty: 90 TABLET | Refills: 1 | Status: SHIPPED | OUTPATIENT
Start: 2022-11-25 | End: 2022-11-25

## 2022-12-05 DIAGNOSIS — K21.9 GASTROESOPHAGEAL REFLUX DISEASE WITHOUT ESOPHAGITIS: ICD-10-CM

## 2022-12-06 RX ORDER — OMEPRAZOLE 20 MG/1
CAPSULE, DELAYED RELEASE ORAL
Qty: 90 CAPSULE | Refills: 1 | Status: SHIPPED | OUTPATIENT
Start: 2022-12-06

## 2022-12-12 DIAGNOSIS — E78.2 MIXED HYPERLIPIDEMIA: ICD-10-CM

## 2022-12-12 DIAGNOSIS — Z13.89 SCREENING FOR HEMATURIA OR PROTEINURIA: ICD-10-CM

## 2022-12-12 DIAGNOSIS — Z13.0 SCREENING FOR DEFICIENCY ANEMIA: ICD-10-CM

## 2022-12-12 DIAGNOSIS — I10 ESSENTIAL HYPERTENSION: Primary | ICD-10-CM

## 2022-12-14 DIAGNOSIS — E78.2 MIXED HYPERLIPIDEMIA: ICD-10-CM

## 2022-12-15 RX ORDER — SIMVASTATIN 10 MG/1
10 TABLET, FILM COATED ORAL
Qty: 90 TABLET | Refills: 3 | Status: SHIPPED | OUTPATIENT
Start: 2022-12-15

## 2023-01-05 DIAGNOSIS — R97.20 ELEVATED PSA: ICD-10-CM

## 2023-01-05 DIAGNOSIS — N40.1 BPH WITH OBSTRUCTION/LOWER URINARY TRACT SYMPTOMS: ICD-10-CM

## 2023-01-05 DIAGNOSIS — N13.8 BPH WITH OBSTRUCTION/LOWER URINARY TRACT SYMPTOMS: ICD-10-CM

## 2023-01-05 RX ORDER — DUTASTERIDE 0.5 MG/1
CAPSULE, LIQUID FILLED ORAL
Qty: 90 CAPSULE | Refills: 3 | Status: SHIPPED | OUTPATIENT
Start: 2023-01-05

## 2023-01-26 DIAGNOSIS — J31.0 CHRONIC RHINITIS: Primary | ICD-10-CM

## 2023-01-26 RX ORDER — FLUTICASONE PROPIONATE 50 MCG
2 SPRAY, SUSPENSION (ML) NASAL DAILY
Qty: 1 EACH | Refills: 2 | Status: SHIPPED | OUTPATIENT
Start: 2023-01-26

## 2023-01-31 ENCOUNTER — OFFICE VISIT (OUTPATIENT)
Dept: FAMILY MEDICINE CLINIC | Age: 68
End: 2023-01-31
Payer: MEDICAID

## 2023-01-31 VITALS
HEIGHT: 75 IN | SYSTOLIC BLOOD PRESSURE: 142 MMHG | HEART RATE: 80 BPM | BODY MASS INDEX: 31.41 KG/M2 | RESPIRATION RATE: 15 BRPM | OXYGEN SATURATION: 94 % | DIASTOLIC BLOOD PRESSURE: 72 MMHG | WEIGHT: 252.6 LBS | TEMPERATURE: 98.3 F

## 2023-01-31 DIAGNOSIS — I10 ESSENTIAL HYPERTENSION: ICD-10-CM

## 2023-01-31 DIAGNOSIS — R97.20 ELEVATED PSA: ICD-10-CM

## 2023-01-31 DIAGNOSIS — G89.29 CHRONIC MIDLINE BACK PAIN, UNSPECIFIED BACK LOCATION: ICD-10-CM

## 2023-01-31 DIAGNOSIS — T78.40XS ALLERGY, SEQUELA: Primary | ICD-10-CM

## 2023-01-31 DIAGNOSIS — J45.20 MILD INTERMITTENT ASTHMA WITHOUT COMPLICATION: ICD-10-CM

## 2023-01-31 DIAGNOSIS — J31.0 CHRONIC RHINITIS: ICD-10-CM

## 2023-01-31 DIAGNOSIS — N13.8 BPH WITH OBSTRUCTION/LOWER URINARY TRACT SYMPTOMS: ICD-10-CM

## 2023-01-31 DIAGNOSIS — M54.9 CHRONIC MIDLINE BACK PAIN, UNSPECIFIED BACK LOCATION: ICD-10-CM

## 2023-01-31 DIAGNOSIS — E78.2 MIXED HYPERLIPIDEMIA: ICD-10-CM

## 2023-01-31 DIAGNOSIS — N40.1 BPH WITH OBSTRUCTION/LOWER URINARY TRACT SYMPTOMS: ICD-10-CM

## 2023-01-31 PROBLEM — T78.40XA ALLERGIES: Status: ACTIVE | Noted: 2023-01-31

## 2023-01-31 PROCEDURE — 3077F SYST BP >= 140 MM HG: CPT | Performed by: NURSE PRACTITIONER

## 2023-01-31 PROCEDURE — 3078F DIAST BP <80 MM HG: CPT | Performed by: NURSE PRACTITIONER

## 2023-01-31 PROCEDURE — 99213 OFFICE O/P EST LOW 20 MIN: CPT | Performed by: NURSE PRACTITIONER

## 2023-01-31 PROCEDURE — 1123F ACP DISCUSS/DSCN MKR DOCD: CPT | Performed by: NURSE PRACTITIONER

## 2023-01-31 RX ORDER — FLUTICASONE PROPIONATE AND SALMETEROL 500; 50 UG/1; UG/1
POWDER RESPIRATORY (INHALATION)
Qty: 60 EACH | Refills: 0 | Status: SHIPPED | OUTPATIENT
Start: 2023-01-31

## 2023-01-31 RX ORDER — AZELASTINE HCL 205.5 UG/1
2 SPRAY NASAL 2 TIMES DAILY
Qty: 30 ML | Refills: 3 | Status: SHIPPED | OUTPATIENT
Start: 2023-01-31

## 2023-01-31 RX ORDER — FLUTICASONE PROPIONATE 50 MCG
2 SPRAY, SUSPENSION (ML) NASAL DAILY
Qty: 1 EACH | Refills: 2 | Status: SHIPPED | OUTPATIENT
Start: 2023-01-31

## 2023-01-31 RX ORDER — HYDROCHLOROTHIAZIDE 25 MG/1
TABLET ORAL
Qty: 90 TABLET | Refills: 1 | Status: SHIPPED | OUTPATIENT
Start: 2023-01-31

## 2023-01-31 RX ORDER — SIMVASTATIN 10 MG/1
10 TABLET, FILM COATED ORAL
Qty: 90 TABLET | Refills: 3 | Status: SHIPPED | OUTPATIENT
Start: 2023-01-31

## 2023-01-31 RX ORDER — DUTASTERIDE 0.5 MG/1
CAPSULE, LIQUID FILLED ORAL
Qty: 90 CAPSULE | Refills: 3 | Status: SHIPPED | OUTPATIENT
Start: 2023-01-31

## 2023-01-31 RX ORDER — AMLODIPINE BESYLATE 10 MG/1
10 TABLET ORAL DAILY
Qty: 90 TABLET | Refills: 1 | Status: SHIPPED | OUTPATIENT
Start: 2023-01-31

## 2023-01-31 NOTE — PROGRESS NOTES
Leonel Delgado is a 79 y.o. male and presents with Follow-up and Medication Refill     Assessment/Plan:    Diagnoses and all orders for this visit:    1. Allergy, sequela  -     azelastine (Astepro Allergy) 205.5 mcg (0.15 %); 2 Sprays by Both Nostrils route two (2) times a day. 2. Essential hypertension  -     hydroCHLOROthiazide (HYDRODIURIL) 25 mg tablet; TAKE 1 TABLET BY MOUTH DAILY  -     amLODIPine (NORVASC) 10 mg tablet; Take 1 Tablet by mouth daily. 3. Mixed hyperlipidemia  -     simvastatin (ZOCOR) 10 mg tablet; Take 1 Tablet by mouth nightly. 4. Elevated PSA  -     dutasteride (AVODART) 0.5 mg capsule; TAKE 1 CAPSULE BY MOUTH DAILY AFTER DINNER    5. BPH with obstruction/lower urinary tract symptoms  -     dutasteride (AVODART) 0.5 mg capsule; TAKE 1 CAPSULE BY MOUTH DAILY AFTER DINNER    6. Chronic rhinitis  -     fluticasone propionate (FLONASE) 50 mcg/actuation nasal spray; 2 Sprays by Both Nostrils route daily. Indications: inflammation of the nose due to an allergy  -     azelastine (Astepro Allergy) 205.5 mcg (0.15 %); 2 Sprays by Both Nostrils route two (2) times a day. 7. Mild intermittent asthma without complication  -     fluticasone propion-salmeteroL (Wixela Inhub) 500-50 mcg/dose diskus inhaler; INHALE 1 PUFF BY MOUTH EVERY 12 HOURS  -     tiotropium (Spiriva with HandiHaler) 18 mcg inhalation capsule; Take 1 Capsule by inhalation daily. 8. Chronic midline back pain, unspecified back location      Follow-up and Dispositions    Return in about 4 months (around 5/31/2023).        Health Maintenance:   Health Maintenance   Topic Date Due    Shingles Vaccine (2 of 2) 11/05/2018    Colorectal Cancer Screening Combo  08/06/2020    Lipid Screen  12/16/2022    Depression Screen  08/18/2023    Medicare Yearly Exam  08/19/2023    DTaP/Tdap/Td series (2 - Td or Tdap) 09/10/2028    Hepatitis C Screening  Completed    Flu Vaccine  Completed    COVID-19 Vaccine  Completed    Pneumococcal 65+ years  Completed        Subjective:    Labs obtained prior to visit? NO  Reviewed with patient? Not applicable    Colonoscopy-every 6 years  --needs to schedule; reinforced the importance of scheduling    Pt having issues getting Astepro from [facility]  --resent and still issue; will send to another pharmacy    Seeing urology     Refusing back xray for sciatica  --right ankle trace edema    ROS:     Review of Systems   Constitutional:  Negative for chills, fever, malaise/fatigue and weight loss. HENT: Negative. Respiratory:  Negative for cough, shortness of breath and wheezing. Cardiovascular:  Positive for leg swelling. Negative for chest pain and palpitations. Gastrointestinal:  Negative for abdominal pain, blood in stool and constipation. Genitourinary:  Positive for frequency. Negative for hematuria and urgency. X1 at night    Musculoskeletal:  Positive for back pain. Negative for falls. Skin: Negative. Neurological:  Negative for sensory change and headaches. Endo/Heme/Allergies:  Positive for environmental allergies. Psychiatric/Behavioral:  Negative for depression and suicidal ideas. The patient is not nervous/anxious and does not have insomnia. The problem list was updated as a part of today's visit. Patient Active Problem List   Diagnosis Code    Elevated prostate specific antigen (PSA) R97.20    Nocturia R35.1    Prostate nodule N40.2    Epididymo-orchitis N45.3    Mild intermittent asthma without complication H14.32    Mixed hyperlipidemia E78.2    Chronic rhinitis J31.0    LOBO (dyspnea on exertion) R06.09    Essential hypertension, benign I10    Sepsis ([facility] [address] A41.9    Severe persistent asthma without complication J60.51    BPH with obstruction/lower urinary tract symptoms N40.1, N13.8    Allergies T78.40XA    Chronic midline back pain M54.9, G89.29       The PSH, FH were reviewed.       SH:  Social History     Tobacco Use    Smoking status: Never    Smokeless tobacco: Never   Substance Use Topics    Alcohol use: Not Currently    Drug use: No       Medications/Allergies:  Current Outpatient Medications on File Prior to Visit   Medication Sig Dispense Refill    omeprazole (PRILOSEC) 20 mg capsule TAKE 1 CAPSULE BY MOUTH DAILY 90 Capsule 1    tiZANidine (ZANAFLEX) 2 mg tablet Take 1 Tablet by mouth as needed for Muscle Spasm(s). 7 Tablet 2    tamsulosin (FLOMAX) 0.4 mg capsule Take 2 Capsules by mouth daily. 180 Capsule 3    montelukast (Singulair) 10 mg tablet Take 1 Tab by mouth daily. 30 Tab 2    albuterol (PROVENTIL HFA, VENTOLIN HFA, PROAIR HFA) 90 mcg/actuation inhaler Take 180 mcg by inhalation daily. Calcium-Cholecalciferol, D3, 500 mg(1,250mg) -400 unit Tab Take  by mouth daily. [DISCONTINUED] fluticasone propionate (FLONASE) 50 mcg/actuation nasal spray 2 Sprays by Both Nostrils route daily. Indications: inflammation of the nose due to an allergy 1 Each 2    [DISCONTINUED] dutasteride (AVODART) 0.5 mg capsule TAKE 1 CAPSULE BY MOUTH DAILY AFTER DINNER 90 Capsule 3    [DISCONTINUED] simvastatin (ZOCOR) 10 mg tablet Take 1 Tablet by mouth nightly. 90 Tablet 3    [DISCONTINUED] hydroCHLOROthiazide (HYDRODIURIL) 25 mg tablet TAKE 1 TABLET BY MOUTH DAILY 90 Tablet 1    [DISCONTINUED] amLODIPine (NORVASC) 10 mg tablet Take 1 Tablet by mouth daily. 90 Tablet 1    [DISCONTINUED] Wixela Inhub 500-50 mcg/dose diskus inhaler INHALE 1 PUFF BY MOUTH EVERY 12 HOURS 60 Each 0    [DISCONTINUED] tiotropium (Spiriva with HandiHaler) 18 mcg inhalation capsule Take 1 Cap by inhalation daily. 30 Cap 2     No current facility-administered medications on file prior to visit.         Allergies   Allergen Reactions    Ace Inhibitors Swelling    Lisinopril Swelling       Objective:  Visit Vitals  BP (!) 142/72   Pulse 80   Temp 98.3 °F (36.8 °C) (Temporal)   Resp 15   Ht 6' 3\" (1.905 m)   Wt 252 lb 9.6 oz (114.6 kg)   SpO2 94%   BMI 31.57 kg/m²    Body mass index is 31.57 kg/m². Physical assessment  Physical Exam  Constitutional:       General: He is not in acute distress. Appearance: He is obese. He is not toxic-appearing. HENT:      Nose: Rhinorrhea present. Cardiovascular:      Rate and Rhythm: Normal rate and regular rhythm. Heart sounds: Normal heart sounds. Pulmonary:      Breath sounds: Normal breath sounds. Abdominal:      General: Bowel sounds are normal.      Palpations: Abdomen is soft. Musculoskeletal:      Right lower leg: Edema present. Left lower leg: No edema. Comments: Trace right ankle edema   Neurological:      General: No focal deficit present. Mental Status: He is alert and oriented to person, place, and time. Labwork and Ancillary Studies:    CBC w/Diff  Lab Results   Component Value Date/Time    WBC 10.3 12/16/2021 02:17 PM    HGB 14.1 12/16/2021 02:17 PM    PLATELET 818 21/35/3306 02:17 PM         Basic Metabolic Profile  Lab Results   Component Value Date/Time    Sodium 137 03/17/2022 10:49 AM    Potassium 3.7 03/17/2022 10:49 AM    Chloride 102 03/17/2022 10:49 AM    CO2 30 03/17/2022 10:49 AM    Anion gap 5 03/17/2022 10:49 AM    Glucose 94 03/17/2022 10:49 AM    BUN 23 (H) 03/17/2022 10:49 AM    Creatinine 1.10 03/17/2022 10:49 AM    BUN/Creatinine ratio 21 (H) 03/17/2022 10:49 AM    GFR est AA >60 03/17/2022 10:49 AM    GFR est non-AA >60 03/17/2022 10:49 AM    Calcium 9.6 03/17/2022 10:49 AM        Cholesterol  Lab Results   Component Value Date/Time    Cholesterol, total 194 12/16/2021 02:17 PM    HDL Cholesterol 67 (H) 12/16/2021 02:17 PM    LDL, calculated 111 (H) 12/16/2021 02:17 PM    Triglyceride 80 12/16/2021 02:17 PM    CHOL/HDL Ratio 2.9 12/16/2021 02:17 PM           I have discussed the diagnosis with the patient and the intended plan as seen in the above orders. The patient has received an After-Visit Summary and questions were answered concerning future plans.      An After Visit Summary was printed and given to the patient. All diagnosis have been discussed with the patient and all of the patient's questions have been answered. Follow-up and Dispositions    Return in about 4 months (around 5/31/2023). DREW Win  [address] [id number] [facility] [address]  [id number]

## 2023-01-31 NOTE — PROGRESS NOTES
Shakir Ortega presents today for   Chief Complaint   Patient presents with    Follow-up    Medication Refill       Is someone accompanying this pt? no    Is the patient using any DME equipment during OV? no    Depression Screening:  3 most recent PHQ Screens 1/31/2023   Little interest or pleasure in doing things Not at all   Feeling down, depressed, irritable, or hopeless Not at all   Total Score PHQ 2 0       Learning Assessment:  Learning Assessment 7/8/2021   PRIMARY LEARNER Patient   HIGHEST LEVEL OF EDUCATION - PRIMARY LEARNER  4 YEARS OF COLLEGE   BARRIERS PRIMARY LEARNER NONE   CO-LEARNER CAREGIVER No   PRIMARY LANGUAGE ENGLISH   LEARNER PREFERENCE PRIMARY READING     -   ANSWERED BY patient   RELATIONSHIP SELF       Fall Risk  Fall Risk Assessment, last 12 mths 1/31/2023   Able to walk? Yes   Fall in past 12 months? 0   Do you feel unsteady? 0   Are you worried about falling 0       ADL  No flowsheet data found. Health Maintenance reviewed and discussed and ordered per Provider. Health Maintenance Due   Topic Date Due    Shingles Vaccine (2 of 2) 11/05/2018    Colorectal Cancer Screening Combo  08/06/2020    Lipid Screen  12/16/2022   . Coordination of Care:  1. Have you been to the ER, urgent care clinic since your last visit? Hospitalized since your last visit? no    2. Have you seen or consulted any other health care providers outside of the [address] [place] [address] since your last visit? Include any pap smears or colon screening. No      3. For patients aged 39-70: Has the patient had a colonoscopy / FIT/ Cologuard? No       If the patient is female:    4. For patients aged 41-77: Has the patient had a mammogram within the past 2 years? NA - based on age or sex      11. For patients aged 21-65: Has the patient had a pap smear?  NA - based on age or sex

## 2023-03-09 DIAGNOSIS — T78.40XS ALLERGY, UNSPECIFIED, SEQUELA: Primary | ICD-10-CM

## 2023-03-09 DIAGNOSIS — J31.0 CHRONIC RHINITIS: ICD-10-CM

## 2023-03-09 RX ORDER — CETIRIZINE HYDROCHLORIDE 10 MG/1
10 TABLET ORAL DAILY
Qty: 90 TABLET | Refills: 1 | Status: SHIPPED | OUTPATIENT
Start: 2023-03-09

## 2023-03-09 RX ORDER — AMLODIPINE BESYLATE 5 MG/1
TABLET ORAL
COMMUNITY
Start: 2022-12-12 | End: 2023-03-09 | Stop reason: ALTCHOICE

## 2023-03-12 RX ORDER — FLUTICASONE PROPIONATE AND SALMETEROL 500; 50 UG/1; UG/1
POWDER RESPIRATORY (INHALATION)
Qty: 60 EACH | Refills: 2 | Status: SHIPPED | OUTPATIENT
Start: 2023-03-12

## 2023-03-14 RX ORDER — AMLODIPINE BESYLATE 5 MG/1
TABLET ORAL
Qty: 90 TABLET | OUTPATIENT
Start: 2023-03-14

## 2023-03-14 RX ORDER — SIMVASTATIN 10 MG
TABLET ORAL
Qty: 90 TABLET | OUTPATIENT
Start: 2023-03-14

## 2023-03-17 DIAGNOSIS — T78.40XS ALLERGY, UNSPECIFIED, SEQUELA: Primary | ICD-10-CM

## 2023-03-17 DIAGNOSIS — E78.2 MIXED HYPERLIPIDEMIA: ICD-10-CM

## 2023-03-17 RX ORDER — AZELASTINE 1 MG/ML
2 SPRAY, METERED NASAL 2 TIMES DAILY
Qty: 60 ML | Refills: 5 | Status: SHIPPED | OUTPATIENT
Start: 2023-03-17

## 2023-03-17 RX ORDER — SIMVASTATIN 10 MG
10 TABLET ORAL NIGHTLY
Qty: 90 TABLET | Refills: 1 | Status: SHIPPED | OUTPATIENT
Start: 2023-03-17

## 2023-03-17 NOTE — TELEPHONE ENCOUNTER
----- Message from Hattie Flower LPN sent at 9/85/5553  1:26 PM EDT -----  Regarding: FW: Allergy medication     ----- Message -----  From: Eugene Wright  Sent: 3/17/2023  10:40 AM EDT  To: [address] Clinical Staff  Subject: Allergy medication                               Zyrtec is an OTC medication not covered by insurance companies! It does not work for me as I have already informed you  I have severe allergies. There are prescription allergy medications available! I also need my my Simvastatin refilled today March 17, 2023. You denied it because I hadnt scheduled appointment after just seeing you in January! I dont want to go into the weekend without my medication. Its already been 5 days without a refill. I have been asking you since December 2022  for a prescription for anything comparable to Astepro which is no longer available through prescriptions! As mentioned, there are available prescription allergy medications. I can not reconcile whats the problem? Do you not refer to an MD on call? Is there a MD on staff? When I call the office the telephone rings up 20 times before it clicks off and I start the process over until someone finally picks up! I do not believe asking for my medications to be prescribed in a  timely fashion or trying to   obtained those that work for me is asking to much! Thank You   Olvin Wilson

## 2023-05-05 ENCOUNTER — HOSPITAL ENCOUNTER (OUTPATIENT)
Facility: HOSPITAL | Age: 68
Setting detail: SPECIMEN
End: 2023-05-05
Payer: MEDICARE

## 2023-05-05 DIAGNOSIS — I10 ESSENTIAL (PRIMARY) HYPERTENSION: ICD-10-CM

## 2023-05-05 DIAGNOSIS — R73.09 ELEVATED RANDOM BLOOD GLUCOSE LEVEL: ICD-10-CM

## 2023-05-05 DIAGNOSIS — R30.0 DYSURIA: ICD-10-CM

## 2023-05-05 DIAGNOSIS — E78.2 MIXED HYPERLIPIDEMIA: Primary | ICD-10-CM

## 2023-05-05 DIAGNOSIS — Z11.59 NEED FOR HEPATITIS C SCREENING TEST: ICD-10-CM

## 2023-05-05 DIAGNOSIS — E78.2 MIXED HYPERLIPIDEMIA: ICD-10-CM

## 2023-05-05 LAB
ALBUMIN SERPL-MCNC: 3.9 G/DL (ref 3.4–5)
ALBUMIN/GLOB SERPL: 1.1 (ref 0.8–1.7)
ALP SERPL-CCNC: 120 U/L (ref 45–117)
ALT SERPL-CCNC: 30 U/L (ref 16–61)
ANION GAP SERPL CALC-SCNC: 6 MMOL/L (ref 3–18)
APPEARANCE UR: CLEAR
AST SERPL-CCNC: 22 U/L (ref 10–38)
BILIRUB SERPL-MCNC: 1 MG/DL (ref 0.2–1)
BILIRUB UR QL: NEGATIVE
BUN SERPL-MCNC: 21 MG/DL (ref 7–18)
BUN/CREAT SERPL: 21 (ref 12–20)
CALCIUM SERPL-MCNC: 9.4 MG/DL (ref 8.5–10.1)
CHLORIDE SERPL-SCNC: 103 MMOL/L (ref 100–111)
CHOLEST SERPL-MCNC: 185 MG/DL
CO2 SERPL-SCNC: 29 MMOL/L (ref 21–32)
COLOR UR: YELLOW
CREAT SERPL-MCNC: 1.02 MG/DL (ref 0.6–1.3)
EST. AVERAGE GLUCOSE BLD GHB EST-MCNC: 108 MG/DL
GLOBULIN SER CALC-MCNC: 3.6 G/DL (ref 2–4)
GLUCOSE SERPL-MCNC: 101 MG/DL (ref 74–99)
GLUCOSE UR STRIP.AUTO-MCNC: NEGATIVE MG/DL
HBA1C MFR BLD: 5.4 % (ref 4.2–5.6)
HDLC SERPL-MCNC: 71 MG/DL (ref 40–60)
HDLC SERPL: 2.6 (ref 0–5)
HGB UR QL STRIP: NEGATIVE
KETONES UR QL STRIP.AUTO: NEGATIVE MG/DL
LDLC SERPL CALC-MCNC: 94.8 MG/DL (ref 0–100)
LEUKOCYTE ESTERASE UR QL STRIP.AUTO: NEGATIVE
LIPID PANEL: ABNORMAL
NITRITE UR QL STRIP.AUTO: NEGATIVE
PH UR STRIP: 7.5 (ref 5–8)
POTASSIUM SERPL-SCNC: 3.4 MMOL/L (ref 3.5–5.5)
PROT SERPL-MCNC: 7.5 G/DL (ref 6.4–8.2)
PROT UR STRIP-MCNC: NEGATIVE MG/DL
SODIUM SERPL-SCNC: 138 MMOL/L (ref 136–145)
SP GR UR REFRACTOMETRY: 1.01 (ref 1–1.03)
TRIGL SERPL-MCNC: 96 MG/DL
UROBILINOGEN UR QL STRIP.AUTO: 0.2 EU/DL (ref 0.2–1)
VLDLC SERPL CALC-MCNC: 19.2 MG/DL

## 2023-05-05 PROCEDURE — 80053 COMPREHEN METABOLIC PANEL: CPT

## 2023-05-05 PROCEDURE — 81003 URINALYSIS AUTO W/O SCOPE: CPT

## 2023-05-05 PROCEDURE — 36415 COLL VENOUS BLD VENIPUNCTURE: CPT

## 2023-05-05 PROCEDURE — 86803 HEPATITIS C AB TEST: CPT

## 2023-05-05 PROCEDURE — 80061 LIPID PANEL: CPT

## 2023-05-05 PROCEDURE — 83036 HEMOGLOBIN GLYCOSYLATED A1C: CPT

## 2023-05-05 RX ORDER — AMLODIPINE BESYLATE 10 MG/1
10 TABLET ORAL DAILY
Qty: 90 TABLET | Refills: 1 | Status: SHIPPED | OUTPATIENT
Start: 2023-05-05

## 2023-05-09 LAB
HCV AB SERPL QL IA: NORMAL
HCV IGG SERPL QL IA: NON REACTIVE S/CO RATIO

## 2023-05-12 SDOH — ECONOMIC STABILITY: INCOME INSECURITY: HOW HARD IS IT FOR YOU TO PAY FOR THE VERY BASICS LIKE FOOD, HOUSING, MEDICAL CARE, AND HEATING?: PATIENT DECLINED

## 2023-05-12 SDOH — ECONOMIC STABILITY: TRANSPORTATION INSECURITY
IN THE PAST 12 MONTHS, HAS LACK OF TRANSPORTATION KEPT YOU FROM MEETINGS, WORK, OR FROM GETTING THINGS NEEDED FOR DAILY LIVING?: PATIENT DECLINED

## 2023-05-12 SDOH — ECONOMIC STABILITY: FOOD INSECURITY: WITHIN THE PAST 12 MONTHS, YOU WORRIED THAT YOUR FOOD WOULD RUN OUT BEFORE YOU GOT MONEY TO BUY MORE.: PATIENT DECLINED

## 2023-05-12 SDOH — ECONOMIC STABILITY: HOUSING INSECURITY
IN THE LAST 12 MONTHS, WAS THERE A TIME WHEN YOU DID NOT HAVE A STEADY PLACE TO SLEEP OR SLEPT IN A SHELTER (INCLUDING NOW)?: PATIENT REFUSED

## 2023-05-12 SDOH — ECONOMIC STABILITY: FOOD INSECURITY: WITHIN THE PAST 12 MONTHS, THE FOOD YOU BOUGHT JUST DIDN'T LAST AND YOU DIDN'T HAVE MONEY TO GET MORE.: PATIENT DECLINED

## 2023-05-15 ENCOUNTER — OFFICE VISIT (OUTPATIENT)
Facility: CLINIC | Age: 68
End: 2023-05-15

## 2023-05-15 VITALS
SYSTOLIC BLOOD PRESSURE: 133 MMHG | WEIGHT: 256.2 LBS | HEIGHT: 75 IN | OXYGEN SATURATION: 94 % | HEART RATE: 80 BPM | RESPIRATION RATE: 15 BRPM | BODY MASS INDEX: 31.85 KG/M2 | DIASTOLIC BLOOD PRESSURE: 81 MMHG | TEMPERATURE: 98.3 F

## 2023-05-15 DIAGNOSIS — N40.1 BENIGN PROSTATIC HYPERPLASIA WITH LOWER URINARY TRACT SYMPTOMS, SYMPTOM DETAILS UNSPECIFIED: ICD-10-CM

## 2023-05-15 DIAGNOSIS — R63.5 WEIGHT GAIN: ICD-10-CM

## 2023-05-15 DIAGNOSIS — E87.6 LOW BLOOD POTASSIUM: ICD-10-CM

## 2023-05-15 DIAGNOSIS — E78.2 MIXED HYPERLIPIDEMIA: ICD-10-CM

## 2023-05-15 DIAGNOSIS — E66.09 CLASS 1 OBESITY DUE TO EXCESS CALORIES WITH SERIOUS COMORBIDITY AND BODY MASS INDEX (BMI) OF 32.0 TO 32.9 IN ADULT: ICD-10-CM

## 2023-05-15 DIAGNOSIS — Z82.3 FAMILY HISTORY OF STROKE: ICD-10-CM

## 2023-05-15 DIAGNOSIS — I10 ESSENTIAL (PRIMARY) HYPERTENSION: Primary | ICD-10-CM

## 2023-05-15 DIAGNOSIS — Z12.11 COLON CANCER SCREENING: ICD-10-CM

## 2023-05-15 DIAGNOSIS — J45.50 SEVERE PERSISTENT ASTHMA WITHOUT COMPLICATION: ICD-10-CM

## 2023-05-15 DIAGNOSIS — K21.9 GASTRO-ESOPHAGEAL REFLUX DISEASE WITHOUT ESOPHAGITIS: ICD-10-CM

## 2023-05-15 PROBLEM — E66.811 CLASS 1 OBESITY DUE TO EXCESS CALORIES WITH SERIOUS COMORBIDITY AND BODY MASS INDEX (BMI) OF 32.0 TO 32.9 IN ADULT: Status: ACTIVE | Noted: 2023-05-15

## 2023-05-15 RX ORDER — POTASSIUM CHLORIDE 750 MG/1
10 TABLET, EXTENDED RELEASE ORAL DAILY
Qty: 14 TABLET | Refills: 0 | Status: SHIPPED | OUTPATIENT
Start: 2023-05-15

## 2023-05-15 RX ORDER — OMEPRAZOLE 20 MG/1
20 CAPSULE, DELAYED RELEASE ORAL DAILY
Qty: 90 CAPSULE | Refills: 1 | Status: SHIPPED | OUTPATIENT
Start: 2023-05-15

## 2023-05-15 RX ORDER — SIMVASTATIN 10 MG
10 TABLET ORAL NIGHTLY
Qty: 90 TABLET | Refills: 1 | Status: SHIPPED | OUTPATIENT
Start: 2023-05-15

## 2023-05-15 RX ORDER — TIZANIDINE 2 MG/1
2 TABLET ORAL PRN
Status: CANCELLED | OUTPATIENT
Start: 2023-05-15

## 2023-05-15 RX ORDER — HYDROCHLOROTHIAZIDE 25 MG/1
25 TABLET ORAL DAILY
Qty: 90 TABLET | Refills: 1 | Status: SHIPPED | OUTPATIENT
Start: 2023-05-15

## 2023-05-15 ASSESSMENT — PATIENT HEALTH QUESTIONNAIRE - PHQ9
2. FEELING DOWN, DEPRESSED OR HOPELESS: 0
SUM OF ALL RESPONSES TO PHQ QUESTIONS 1-9: 0
SUM OF ALL RESPONSES TO PHQ9 QUESTIONS 1 & 2: 0
SUM OF ALL RESPONSES TO PHQ QUESTIONS 1-9: 0
SUM OF ALL RESPONSES TO PHQ QUESTIONS 1-9: 0
1. LITTLE INTEREST OR PLEASURE IN DOING THINGS: 0
SUM OF ALL RESPONSES TO PHQ QUESTIONS 1-9: 0

## 2023-05-15 NOTE — PROGRESS NOTES
Karel Sophy presents today for   Chief Complaint   Patient presents with    Follow-up    Weight Management       Is someone accompanying this pt? no    Is the patient using any DME equipment during OV? no    Depression Screening:  No flowsheet data found. Learning Assessment:  No flowsheet data found. Fall Risk  No flowsheet data found. ADL  No flowsheet data found. Health Maintenance reviewed and discussed and ordered per Provider. Health Maintenance Due   Topic Date Due    Shingles vaccine (2 of 2) 11/05/2018    Colorectal Cancer Screen  08/06/2020   . Coordination of Care:  1. Have you been to the ER, urgent care clinic since your last visit? Hospitalized since your last visit? no    2. Have you seen or consulted any other health care providers outside of the [address] [place] [address] since your last visit? Include any pap smears or colon screening. Yes Pulmonary     3. For patients aged 39-70: Has the patient had a colonoscopy / FIT/ Cologuard?  no      If the patient is female:    4. For patients aged 41-77: Has the patient had a mammogram within the past 2 years? N/a      5. For patients aged 21-65: Has the patient had a pap smear?  N/a

## 2023-05-15 NOTE — PROGRESS NOTES
Lieutenant Fields is a 76 y.o. male and presents with Follow-up and Weight Management       Assessment/Plan:    1. Essential (primary) hypertension  -     hydroCHLOROthiazide (HYDRODIURIL) 25 MG tablet; Take 1 tablet by mouth daily, Disp-90 tablet, R-1Normal  -     Semaglutide-Weight Management (WEGOVY) 0.25 MG/0.5ML SOAJ SC injection; Inject 0.25 mg into the skin every 7 days, Disp-2 mL, R-0Normal  2. Mixed hyperlipidemia  -     simvastatin (ZOCOR) 10 MG tablet; Take 1 tablet by mouth nightly, Disp-90 tablet, R-1Normal  3. Gastro-esophageal reflux disease without esophagitis  -     omeprazole (PRILOSEC) 20 MG delayed release capsule; Take 1 capsule by mouth daily, Disp-90 capsule, R-1Normal  4. Low blood potassium  -     potassium chloride (KLOR-CON M) 10 MEQ extended release tablet; Take 1 tablet by mouth daily, Disp-14 tablet, R-0Normal  5. Family history of stroke  6. Severe persistent asthma without complication  7. Class 1 obesity due to excess calories with serious comorbidity and body mass index (BMI) of 32.0 to 32.9 in adult  -     Semaglutide-Weight Management (WEGOVY) 0.25 MG/0.5ML SOAJ SC injection; Inject 0.25 mg into the skin every 7 days, Disp-2 mL, R-0Normal  8. Weight gain  -     TSH; Future  9. Benign prostatic hyperplasia with lower urinary tract symptoms, symptom details unspecified         Follow up and disposition:   No follow-ups on file. Subjective:    Labs obtained prior to visit? Yes  Reviewed with patient? yes    Sees pulmonary--severe asthma/allergies    Patient states wants to try something for weight management   --has difficulty exercising because of severe asthma and allergies    ROS:     Review of Systems   Constitutional:  Negative for chills and fatigue. HENT:  Positive for postnasal drip, rhinorrhea and sneezing. Respiratory:  Positive for cough. Negative for chest tightness, shortness of breath and wheezing.     Cardiovascular:  Negative for chest pain, palpitations and

## 2023-05-17 ENCOUNTER — TELEPHONE (OUTPATIENT)
Facility: CLINIC | Age: 68
End: 2023-05-17

## 2023-05-19 ENCOUNTER — TELEPHONE (OUTPATIENT)
Facility: CLINIC | Age: 68
End: 2023-05-19

## 2023-05-21 ASSESSMENT — ENCOUNTER SYMPTOMS
COUGH: 1
GASTROINTESTINAL NEGATIVE: 1
ABDOMINAL PAIN: 0
RHINORRHEA: 1
SHORTNESS OF BREATH: 0
CHEST TIGHTNESS: 0
WHEEZING: 0

## 2023-05-23 DIAGNOSIS — E66.09 CLASS 1 OBESITY DUE TO EXCESS CALORIES WITH SERIOUS COMORBIDITY AND BODY MASS INDEX (BMI) OF 32.0 TO 32.9 IN ADULT: ICD-10-CM

## 2023-05-23 DIAGNOSIS — E66.09 CLASS 1 OBESITY DUE TO EXCESS CALORIES WITH SERIOUS COMORBIDITY AND BODY MASS INDEX (BMI) OF 32.0 TO 32.9 IN ADULT: Primary | ICD-10-CM

## 2023-05-23 RX ORDER — PEN NEEDLE, DIABETIC 30 GX5/16"
1 NEEDLE, DISPOSABLE MISCELLANEOUS DAILY
Qty: 100 EACH | Refills: 0 | Status: SHIPPED | OUTPATIENT
Start: 2023-05-23 | End: 2023-05-23 | Stop reason: SDUPTHER

## 2023-05-23 RX ORDER — PEN NEEDLE, DIABETIC 30 GX5/16"
1 NEEDLE, DISPOSABLE MISCELLANEOUS DAILY
Qty: 100 EACH | Refills: 0 | Status: SHIPPED | OUTPATIENT
Start: 2023-05-23

## 2023-05-23 NOTE — TELEPHONE ENCOUNTER
[facility] not covered by patient's insurance. Saxenda and pen needles sent to pharmacy.

## 2023-06-12 ENCOUNTER — NURSE ONLY (OUTPATIENT)
Facility: CLINIC | Age: 68
End: 2023-06-12

## 2023-06-12 VITALS — BODY MASS INDEX: 30.87 KG/M2 | WEIGHT: 247 LBS

## 2023-06-12 DIAGNOSIS — E87.6 LOW BLOOD POTASSIUM: ICD-10-CM

## 2023-06-12 DIAGNOSIS — R63.5 WEIGHT GAIN: ICD-10-CM

## 2023-06-12 ASSESSMENT — PATIENT HEALTH QUESTIONNAIRE - PHQ9
SUM OF ALL RESPONSES TO PHQ9 QUESTIONS 1 & 2: 0
SUM OF ALL RESPONSES TO PHQ QUESTIONS 1-9: 0
1. LITTLE INTEREST OR PLEASURE IN DOING THINGS: 0
SUM OF ALL RESPONSES TO PHQ QUESTIONS 1-9: 0
2. FEELING DOWN, DEPRESSED OR HOPELESS: 0

## 2023-07-07 ENCOUNTER — TELEPHONE (OUTPATIENT)
Facility: CLINIC | Age: 68
End: 2023-07-07

## 2023-07-07 NOTE — TELEPHONE ENCOUNTER
Spoke to patient in regards to the medication Saxenda, patient was informed that [facility] [employer] do not do Prior authorizations. Patient was informed that his prior Baldemar Cadet was done on [address]. Patient started yelling and saying that his insurance states they cover this medication under the conditions that he has. Patient was informed of who is the MD that works there. Patient was also offered to go to [facility] for weight loss. Patient was given the MD name. Patient was informed that I will resubmit form along with the Pulmonary test that was done and when the results comes back he will be informed. Patient verbalized understanding.

## 2023-07-07 NOTE — TELEPHONE ENCOUNTER
Called patient again today to inform patient that I have spoken with his insurance company and they have sent over a form for the NP Aziza Barajas to fill out at 418 pm . Patient states that I do not suppose to do the prior authorizations, the NP or DR suppose to do it. Patient was saying he don't know what we are doing and that he will call the medical board. Patient was informed that we will send all the documentation to his insurance company [employer] [facility]) and they will make an decision. Patient was yelling as I was trying to explain how the process of the prior authorization works. FABIAN Barajas has tried to call the patient to explain the process and patient no longer will answer the phone.

## 2023-07-07 NOTE — TELEPHONE ENCOUNTER
This provider attempted to call patient back regarding form that needs to be filled out for [facility] but left voicemail as patient was irate over the phone previously. Patient was threatening to medical assistant over the phone. Will submit additional paperwork for prior authorization. Attempted to call patient again with no answer and this provider sent [employer] message as well. Belkys Kinsey, nurse manager aware of the situation.

## 2023-07-10 ENCOUNTER — TELEPHONE (OUTPATIENT)
Facility: CLINIC | Age: 68
End: 2023-07-10

## 2023-07-10 NOTE — TELEPHONE ENCOUNTER
Received [employer] denial letter and need for [id number] review for Saxenda injection.     This provider set up for [id number] today at 5 pm.

## 2023-07-11 ENCOUNTER — CLINICAL DOCUMENTATION (OUTPATIENT)
Facility: CLINIC | Age: 68
End: 2023-07-11

## 2023-07-11 NOTE — PROGRESS NOTES
Called patient to let him know that his Saxenda injection was approved from 7/10/2023-11/10/2023. Notified patient to call [facility] to ensure medication is in stock; advised patient to call for another pharmacy if necessary. Patient voiced understanding.

## 2023-09-05 RX ORDER — MONTELUKAST SODIUM 10 MG/1
10 TABLET ORAL DAILY
Qty: 90 TABLET | Refills: 1 | Status: SHIPPED | OUTPATIENT
Start: 2023-09-05

## 2023-09-08 RX ORDER — FLUTICASONE PROPIONATE AND SALMETEROL 500; 50 UG/1; UG/1
POWDER RESPIRATORY (INHALATION)
Qty: 60 EACH | Refills: 2 | Status: SHIPPED | OUTPATIENT
Start: 2023-09-08

## 2023-09-25 DIAGNOSIS — E78.2 MIXED HYPERLIPIDEMIA: ICD-10-CM

## 2023-09-28 RX ORDER — FLUTICASONE PROPIONATE 50 MCG
SPRAY, SUSPENSION (ML) NASAL
Qty: 16 G | Refills: 2 | Status: SHIPPED | OUTPATIENT
Start: 2023-09-28

## 2023-09-28 RX ORDER — SIMVASTATIN 10 MG
10 TABLET ORAL NIGHTLY
Qty: 90 TABLET | Refills: 1 | Status: SHIPPED | OUTPATIENT
Start: 2023-09-28

## 2023-11-09 DIAGNOSIS — K21.9 GASTRO-ESOPHAGEAL REFLUX DISEASE WITHOUT ESOPHAGITIS: ICD-10-CM

## 2023-11-09 RX ORDER — OMEPRAZOLE 20 MG/1
20 CAPSULE, DELAYED RELEASE ORAL DAILY
Qty: 90 CAPSULE | Refills: 1 | Status: SHIPPED | OUTPATIENT
Start: 2023-11-09

## 2023-11-27 DIAGNOSIS — J45.50 SEVERE PERSISTENT ASTHMA WITHOUT COMPLICATION: Primary | ICD-10-CM

## 2023-11-27 DIAGNOSIS — T78.40XS ALLERGY, UNSPECIFIED, SEQUELA: ICD-10-CM

## 2023-11-27 RX ORDER — AZELASTINE 1 MG/ML
2 SPRAY, METERED NASAL 2 TIMES DAILY
Qty: 60 ML | Refills: 5 | Status: SHIPPED | OUTPATIENT
Start: 2023-11-27

## 2023-11-27 RX ORDER — ALBUTEROL SULFATE 90 UG/1
1 AEROSOL, METERED RESPIRATORY (INHALATION) DAILY
Qty: 18 G | Refills: 2 | Status: SHIPPED | OUTPATIENT
Start: 2023-11-27

## 2023-11-27 RX ORDER — FLUTICASONE PROPIONATE 50 MCG
SPRAY, SUSPENSION (ML) NASAL
Qty: 16 G | Refills: 2 | Status: SHIPPED | OUTPATIENT
Start: 2023-11-27

## 2023-11-27 RX ORDER — TIOTROPIUM BROMIDE 18 UG/1
18 CAPSULE ORAL; RESPIRATORY (INHALATION) DAILY
Qty: 30 CAPSULE | Refills: 2 | Status: SHIPPED | OUTPATIENT
Start: 2023-11-27

## 2023-12-11 RX ORDER — FLUTICASONE PROPIONATE AND SALMETEROL 500; 50 UG/1; UG/1
POWDER RESPIRATORY (INHALATION)
Qty: 60 EACH | Refills: 2 | Status: SHIPPED | OUTPATIENT
Start: 2023-12-11

## 2024-01-04 DIAGNOSIS — J45.50 SEVERE PERSISTENT ASTHMA WITHOUT COMPLICATION: ICD-10-CM

## 2024-01-04 RX ORDER — DUTASTERIDE 0.5 MG/1
CAPSULE, LIQUID FILLED ORAL
Qty: 30 CAPSULE | Status: CANCELLED | OUTPATIENT
Start: 2024-01-04

## 2024-01-05 RX ORDER — ALBUTEROL SULFATE 90 UG/1
1 AEROSOL, METERED RESPIRATORY (INHALATION) DAILY
Qty: 18 G | Refills: 2 | Status: SHIPPED | OUTPATIENT
Start: 2024-01-05

## 2024-02-06 RX ORDER — AMLODIPINE BESYLATE 10 MG/1
10 TABLET ORAL DAILY
Qty: 90 TABLET | Refills: 1 | Status: SHIPPED | OUTPATIENT
Start: 2024-02-06

## 2024-02-07 DIAGNOSIS — I10 ESSENTIAL (PRIMARY) HYPERTENSION: ICD-10-CM

## 2024-02-07 RX ORDER — HYDROCHLOROTHIAZIDE 25 MG/1
25 TABLET ORAL DAILY
Qty: 90 TABLET | Refills: 1 | Status: SHIPPED | OUTPATIENT
Start: 2024-02-07

## 2024-02-20 ENCOUNTER — OFFICE VISIT (OUTPATIENT)
Facility: CLINIC | Age: 69
End: 2024-02-20
Payer: MEDICARE

## 2024-02-20 VITALS
SYSTOLIC BLOOD PRESSURE: 125 MMHG | HEART RATE: 74 BPM | RESPIRATION RATE: 15 BRPM | BODY MASS INDEX: 29.77 KG/M2 | WEIGHT: 239.4 LBS | TEMPERATURE: 98.3 F | HEIGHT: 75 IN | OXYGEN SATURATION: 97 % | DIASTOLIC BLOOD PRESSURE: 76 MMHG

## 2024-02-20 DIAGNOSIS — J45.50 SEVERE PERSISTENT ASTHMA WITHOUT COMPLICATION: Primary | ICD-10-CM

## 2024-02-20 DIAGNOSIS — G57.01 PIRIFORMIS SYNDROME OF RIGHT SIDE: ICD-10-CM

## 2024-02-20 DIAGNOSIS — T78.40XS ALLERGY, UNSPECIFIED, SEQUELA: ICD-10-CM

## 2024-02-20 DIAGNOSIS — E66.09 CLASS 1 OBESITY DUE TO EXCESS CALORIES WITH SERIOUS COMORBIDITY AND BODY MASS INDEX (BMI) OF 32.0 TO 32.9 IN ADULT: ICD-10-CM

## 2024-02-20 DIAGNOSIS — R97.20 ELEVATED PROSTATE SPECIFIC ANTIGEN (PSA): ICD-10-CM

## 2024-02-20 DIAGNOSIS — I10 ESSENTIAL (PRIMARY) HYPERTENSION: ICD-10-CM

## 2024-02-20 DIAGNOSIS — J31.0 CHRONIC RHINITIS: ICD-10-CM

## 2024-02-20 DIAGNOSIS — M54.41 ACUTE RIGHT-SIDED LOW BACK PAIN WITH RIGHT-SIDED SCIATICA: ICD-10-CM

## 2024-02-20 PROCEDURE — 1123F ACP DISCUSS/DSCN MKR DOCD: CPT | Performed by: NURSE PRACTITIONER

## 2024-02-20 PROCEDURE — 3074F SYST BP LT 130 MM HG: CPT | Performed by: NURSE PRACTITIONER

## 2024-02-20 PROCEDURE — 3017F COLORECTAL CA SCREEN DOC REV: CPT | Performed by: NURSE PRACTITIONER

## 2024-02-20 PROCEDURE — G8417 CALC BMI ABV UP PARAM F/U: HCPCS | Performed by: NURSE PRACTITIONER

## 2024-02-20 PROCEDURE — 99213 OFFICE O/P EST LOW 20 MIN: CPT | Performed by: NURSE PRACTITIONER

## 2024-02-20 PROCEDURE — G8484 FLU IMMUNIZE NO ADMIN: HCPCS | Performed by: NURSE PRACTITIONER

## 2024-02-20 PROCEDURE — 3078F DIAST BP <80 MM HG: CPT | Performed by: NURSE PRACTITIONER

## 2024-02-20 PROCEDURE — G8428 CUR MEDS NOT DOCUMENT: HCPCS | Performed by: NURSE PRACTITIONER

## 2024-02-20 PROCEDURE — 1036F TOBACCO NON-USER: CPT | Performed by: NURSE PRACTITIONER

## 2024-02-20 RX ORDER — AZELASTINE 1 MG/ML
2 SPRAY, METERED NASAL 2 TIMES DAILY
Qty: 60 ML | Refills: 5 | Status: SHIPPED | OUTPATIENT
Start: 2024-02-20

## 2024-02-20 RX ORDER — AZELASTINE 1 MG/ML
2 SPRAY, METERED NASAL 2 TIMES DAILY
Qty: 60 ML | Refills: 5 | Status: CANCELLED | OUTPATIENT
Start: 2024-02-20

## 2024-02-20 RX ORDER — METHYLPREDNISOLONE 4 MG/1
TABLET ORAL
Qty: 1 KIT | Refills: 1 | Status: SHIPPED | OUTPATIENT
Start: 2024-02-20 | End: 2024-02-26

## 2024-02-20 RX ORDER — FLUTICASONE PROPIONATE 50 MCG
SPRAY, SUSPENSION (ML) NASAL
Qty: 16 G | Refills: 2 | Status: SHIPPED | OUTPATIENT
Start: 2024-02-20

## 2024-02-20 RX ORDER — METHYLPREDNISOLONE 4 MG/1
TABLET ORAL
Qty: 1 KIT | Refills: 1 | Status: CANCELLED | OUTPATIENT
Start: 2024-02-20 | End: 2024-02-26

## 2024-02-20 RX ORDER — FLUTICASONE PROPIONATE 50 MCG
SPRAY, SUSPENSION (ML) NASAL
Qty: 16 G | Refills: 2 | Status: CANCELLED | OUTPATIENT
Start: 2024-02-20

## 2024-02-20 ASSESSMENT — PATIENT HEALTH QUESTIONNAIRE - PHQ9
SUM OF ALL RESPONSES TO PHQ9 QUESTIONS 1 & 2: 0
SUM OF ALL RESPONSES TO PHQ QUESTIONS 1-9: 0
SUM OF ALL RESPONSES TO PHQ QUESTIONS 1-9: 0
1. LITTLE INTEREST OR PLEASURE IN DOING THINGS: 0
SUM OF ALL RESPONSES TO PHQ QUESTIONS 1-9: 0
SUM OF ALL RESPONSES TO PHQ QUESTIONS 1-9: 0
2. FEELING DOWN, DEPRESSED OR HOPELESS: 0

## 2024-02-20 NOTE — ASSESSMENT & PLAN NOTE
Monitored by specialist- no acute findings meriting change in the plan  Recent PSA normal; repeat in 1 year per urology

## 2024-02-20 NOTE — PROGRESS NOTES
Olvin Maravilla presents today for   Chief Complaint   Patient presents with    Back Pain    Leg Pain       Is someone accompanying this pt? no    Is the patient using any DME equipment during OV? Cane     Depression Screening:       No data to display                Learning Assessment:  Failed to redirect to the Timeline version of the REVFS SmartLink.    Fall Risk  Failed to redirect to the Timeline version of the REVFS SmartLink.    ADL       No data to display                Health Maintenance reviewed and discussed and ordered per Provider.    Health Maintenance Due   Topic Date Due    Respiratory Syncytial Virus (RSV) Pregnant or age 60 yrs+ (1 - 1-dose 60+ series) Never done    Colorectal Cancer Screen  08/06/2020    Flu vaccine (1) 08/01/2023    COVID-19 Vaccine (6 - 2023-24 season) 09/01/2023    Annual Wellness Visit (Medicare Advantage)  Never done   .    \"Have you been to the ER, urgent care clinic since your last visit?  Hospitalized since your last visit?\"    no    “Have you seen or consulted any other health care providers outside of [facility] since your last visit?”        “Have you had a colorectal cancer screening such as a colonoscopy/FIT/Cologuard?    no       
answered concerning future plans.     An After Visit Summary was printed and given to the patient.    All diagnosis have been discussed with the patient and all of the patient's questions have been answered.           Ebony Pro  [address] [address]

## 2024-02-22 ASSESSMENT — ENCOUNTER SYMPTOMS
SHORTNESS OF BREATH: 0
CHEST TIGHTNESS: 0
ABDOMINAL PAIN: 0
BACK PAIN: 1
RHINORRHEA: 1
GASTROINTESTINAL NEGATIVE: 1
WHEEZING: 0

## 2024-04-19 DIAGNOSIS — G57.01 PIRIFORMIS SYNDROME OF RIGHT SIDE: ICD-10-CM

## 2024-04-19 DIAGNOSIS — M54.41 ACUTE RIGHT-SIDED LOW BACK PAIN WITH RIGHT-SIDED SCIATICA: Primary | ICD-10-CM

## 2024-04-19 RX ORDER — METHYLPREDNISOLONE 4 MG/1
TABLET ORAL
Qty: 1 KIT | Refills: 0 | Status: SHIPPED | OUTPATIENT
Start: 2024-04-19 | End: 2024-04-25

## 2024-04-19 NOTE — PROGRESS NOTES
Medrol dose package sent and referral to physical medicine/rehab sent. Patient notified via mychart and left voicemail.

## 2024-05-07 DIAGNOSIS — K21.9 GASTRO-ESOPHAGEAL REFLUX DISEASE WITHOUT ESOPHAGITIS: ICD-10-CM

## 2024-05-08 RX ORDER — OMEPRAZOLE 20 MG/1
20 CAPSULE, DELAYED RELEASE ORAL DAILY
Qty: 90 CAPSULE | Refills: 1 | Status: SHIPPED | OUTPATIENT
Start: 2024-05-08

## 2024-08-01 RX ORDER — AMLODIPINE BESYLATE 10 MG/1
10 TABLET ORAL DAILY
Qty: 90 TABLET | Refills: 1 | Status: SHIPPED | OUTPATIENT
Start: 2024-08-01

## 2024-08-06 DIAGNOSIS — I10 ESSENTIAL (PRIMARY) HYPERTENSION: ICD-10-CM

## 2024-08-06 RX ORDER — HYDROCHLOROTHIAZIDE 25 MG/1
25 TABLET ORAL DAILY
Qty: 90 TABLET | Refills: 1 | Status: SHIPPED | OUTPATIENT
Start: 2024-08-06

## 2024-08-19 ENCOUNTER — OFFICE VISIT (OUTPATIENT)
Facility: CLINIC | Age: 69
End: 2024-08-19

## 2024-08-19 VITALS
DIASTOLIC BLOOD PRESSURE: 75 MMHG | TEMPERATURE: 97.9 F | BODY MASS INDEX: 30.14 KG/M2 | RESPIRATION RATE: 18 BRPM | HEART RATE: 80 BPM | WEIGHT: 242.4 LBS | OXYGEN SATURATION: 96 % | HEIGHT: 75 IN | SYSTOLIC BLOOD PRESSURE: 128 MMHG

## 2024-08-19 DIAGNOSIS — E78.2 MIXED HYPERLIPIDEMIA: ICD-10-CM

## 2024-08-19 DIAGNOSIS — Z13.89 SCREENING FOR HEMATURIA OR PROTEINURIA: ICD-10-CM

## 2024-08-19 DIAGNOSIS — J31.0 CHRONIC RHINITIS: Primary | ICD-10-CM

## 2024-08-19 DIAGNOSIS — Z13.1 SCREENING FOR DIABETES MELLITUS (DM): ICD-10-CM

## 2024-08-19 DIAGNOSIS — Z13.29 SCREENING FOR THYROID DISORDER: ICD-10-CM

## 2024-08-19 DIAGNOSIS — E87.6 HYPOKALEMIA: ICD-10-CM

## 2024-08-19 DIAGNOSIS — T78.40XS ALLERGY, UNSPECIFIED, SEQUELA: ICD-10-CM

## 2024-08-19 DIAGNOSIS — I10 ESSENTIAL (PRIMARY) HYPERTENSION: ICD-10-CM

## 2024-08-19 DIAGNOSIS — J45.50 SEVERE PERSISTENT ASTHMA WITHOUT COMPLICATION: ICD-10-CM

## 2024-08-19 SDOH — ECONOMIC STABILITY: FOOD INSECURITY: WITHIN THE PAST 12 MONTHS, THE FOOD YOU BOUGHT JUST DIDN'T LAST AND YOU DIDN'T HAVE MONEY TO GET MORE.: NEVER TRUE

## 2024-08-19 SDOH — ECONOMIC STABILITY: INCOME INSECURITY: HOW HARD IS IT FOR YOU TO PAY FOR THE VERY BASICS LIKE FOOD, HOUSING, MEDICAL CARE, AND HEATING?: NOT HARD AT ALL

## 2024-08-19 SDOH — ECONOMIC STABILITY: FOOD INSECURITY: WITHIN THE PAST 12 MONTHS, YOU WORRIED THAT YOUR FOOD WOULD RUN OUT BEFORE YOU GOT MONEY TO BUY MORE.: NEVER TRUE

## 2024-08-19 ASSESSMENT — PATIENT HEALTH QUESTIONNAIRE - PHQ9
SUM OF ALL RESPONSES TO PHQ QUESTIONS 1-9: 0
SUM OF ALL RESPONSES TO PHQ QUESTIONS 1-9: 0
2. FEELING DOWN, DEPRESSED OR HOPELESS: NOT AT ALL
SUM OF ALL RESPONSES TO PHQ QUESTIONS 1-9: 0
SUM OF ALL RESPONSES TO PHQ9 QUESTIONS 1 & 2: 0
1. LITTLE INTEREST OR PLEASURE IN DOING THINGS: NOT AT ALL
SUM OF ALL RESPONSES TO PHQ QUESTIONS 1-9: 0

## 2024-08-19 ASSESSMENT — LIFESTYLE VARIABLES
HOW OFTEN DO YOU HAVE A DRINK CONTAINING ALCOHOL: NEVER
HOW MANY STANDARD DRINKS CONTAINING ALCOHOL DO YOU HAVE ON A TYPICAL DAY: PATIENT DOES NOT DRINK

## 2024-08-19 NOTE — PROGRESS NOTES
[address] [place] [address]               [phone]      Olvin Maravilla is a 69 y.o. male and presents with Follow-up and Annual Exam       Assessment/Plan:    1. Chronic rhinitis  -     External Referral To Allergy  2. Allergy, unspecified, sequela  -     External Referral To Allergy  3. Severe persistent asthma without complication  4. Mixed hyperlipidemia  -     Lipid Panel; Future  5. Essential (primary) hypertension  -     Comprehensive Metabolic Panel; Future  6. Screening for diabetes mellitus (DM)  -     Hemoglobin A1C; Future  7. Screening for hematuria or proteinuria  -     Urinalysis; Future  8. Screening for thyroid disorder  -     TSH; Future  9. Hypokalemia  The following orders have not been finalized:  -     potassium chloride (KLOR-CON M) 10 MEQ extended release tablet         Follow up and disposition:   Return in about 1 day (around 8/20/2024) for fasting lab visit and then 4 month f/u .      Subjective:    Labs obtained prior to visit? No  Reviewed with patient? N/A    Colonoscopy--needs someone to drive him   --refuses GI referral; history of colon polyps      ROS:     Review of Systems   Constitutional:  Negative for chills and fatigue.   HENT:  Positive for postnasal drip and rhinorrhea.    Respiratory:  Negative for chest tightness, shortness of breath and wheezing.    Cardiovascular:  Negative for chest pain, palpitations and leg swelling.   Gastrointestinal: Negative.  Negative for abdominal pain.   Genitourinary: Negative.    Musculoskeletal:  Positive for back pain.   Allergic/Immunologic: Positive for environmental allergies.   Neurological: Negative.  Negative for dizziness and headaches.   Psychiatric/Behavioral: Negative.           The problem list was updated as a part of today's visit.  Patient Active Problem List   Diagnosis    Nocturia    Elevated prostate specific antigen (PSA)    Chronic rhinitis    Prostate nodule    Mild intermittent  asthma without complication    Epididymo-orchitis    MILES (dyspnea on exertion)    Mixed hyperlipidemia    Essential (primary) hypertension    Benign prostatic hyperplasia with lower urinary tract symptoms    Allergy, unspecified, sequela    Acute right-sided low back pain with right-sided sciatica    Class 1 obesity due to excess calories with serious comorbidity and body mass index (BMI) of 32.0 to 32.9 in adult    Low blood potassium    Gastro-esophageal reflux disease without esophagitis    Family history of stroke    Weight gain    Piriformis syndrome of right side    Severe persistent asthma without complication       The PSH, FH were reviewed.      SH:  Social History     Tobacco Use    Smoking status: Never    Smokeless tobacco: Never   Vaping Use    Vaping status: Never Used   Substance Use Topics    Alcohol use: Not Currently    Drug use: No       Medications/Allergies:  Current Outpatient Medications on File Prior to Visit   Medication Sig Dispense Refill    hydroCHLOROthiazide (HYDRODIURIL) 25 MG tablet TAKE 1 TABLET BY MOUTH DAILY 90 tablet 1    amLODIPine (NORVASC) 10 MG tablet TAKE 1 TABLET BY MOUTH DAILY 90 tablet 1    omeprazole (PRILOSEC) 20 MG delayed release capsule TAKE 1 CAPSULE BY MOUTH DAILY 90 capsule 1    azelastine (ASTELIN) 0.1 % nasal spray 2 sprays by Nasal route 2 times daily Use in each nostril as directed 60 mL 5    fluticasone (FLONASE) 50 MCG/ACT nasal spray Instill 2 sprays in each nostril daily 16 g 2    albuterol sulfate HFA (PROVENTIL;VENTOLIN;PROAIR) 108 (90 Base) MCG/ACT inhaler Inhale 1 puff into the lungs daily 18 g 2    dutasteride (AVODART) 0.5 MG capsule TAKE 1 CAPSULE BY MOUTH DAILY AFTER DINNER 90 capsule 3    fluticasone-salmeterol (WIXELA INHUB) 500-50 MCG/ACT AEPB diskus inhaler INHALE 1 PUFF BY MOUTH EVERY 12 HOURS 60 each 2    tiotropium (SPIRIVA) 18 MCG inhalation capsule Inhale 1 capsule into the lungs daily 30 capsule 2    simvastatin (ZOCOR) 10 MG tablet Take 1

## 2024-08-19 NOTE — PROGRESS NOTES
Room 16    Olvin Maravilla had concerns including Follow-up. for today's visit .       1. \"Have you been to the ER, urgent care clinic since your last visit?  Hospitalized since your last visit?\" .NO    2. \"Have you seen or consulted any other health care providers outside of the [facility] since your last visit?\" No     3. For patients aged 45-75: Has the patient had a colonoscopy / FIT/ Cologuard?  Patient refused       If the patient is female:    4. For patients aged 40-74: Has the patient had a mammogram within the past 2 years? N/A      5. For patients aged 21-65: Has the patient had a pap smear? {Cancer Care Gap present? N/A          8/19/2024     3:31 PM   Amb Fall Risk Assessment and TUG Test   Do you feel unsteady or are you worried about falling?  no   2 or more falls in past year? no   Fall with injury in past year? no              8/19/2024     3:31 PM   PHQ-9    Little interest or pleasure in doing things 0   Feeling down, depressed, or hopeless 0   PHQ-2 Score 0   PHQ-9 Total Score 0          Health Maintenance Due   Topic Date Due    Respiratory Syncytial Virus (RSV) Pregnant or age 60 yrs+ (1 - 1-dose 60+ series) Never done    Shingles vaccine (2 of 2) 11/05/2018    Colorectal Cancer Screen  08/06/2020    COVID-19 Vaccine (6 - 2023-24 season) 09/01/2023    Annual Wellness Visit (Medicare)  Never done    Lipids  05/05/2024    Flu vaccine (1) 08/01/2024

## 2024-08-20 ENCOUNTER — HOSPITAL ENCOUNTER (OUTPATIENT)
Facility: HOSPITAL | Age: 69
Setting detail: SPECIMEN
Discharge: HOME | End: 2024-08-23
Payer: MEDICARE

## 2024-08-20 DIAGNOSIS — Z13.29 SCREENING FOR THYROID DISORDER: ICD-10-CM

## 2024-08-20 DIAGNOSIS — Z13.1 SCREENING FOR DIABETES MELLITUS (DM): ICD-10-CM

## 2024-08-20 DIAGNOSIS — I10 ESSENTIAL (PRIMARY) HYPERTENSION: ICD-10-CM

## 2024-08-20 DIAGNOSIS — E78.2 MIXED HYPERLIPIDEMIA: ICD-10-CM

## 2024-08-20 DIAGNOSIS — Z13.89 SCREENING FOR HEMATURIA OR PROTEINURIA: ICD-10-CM

## 2024-08-20 LAB
ALBUMIN SERPL-MCNC: 3.5 G/DL (ref 3.4–5)
ALBUMIN/GLOB SERPL: 1 (ref 0.8–1.7)
ALP SERPL-CCNC: 118 U/L (ref 45–117)
ALT SERPL-CCNC: 20 U/L (ref 16–61)
ANION GAP SERPL CALC-SCNC: 8 MMOL/L (ref 3–18)
APPEARANCE UR: CLEAR
AST SERPL-CCNC: 19 U/L (ref 10–38)
BACTERIA URNS QL MICRO: NEGATIVE /HPF
BILIRUB SERPL-MCNC: 1 MG/DL (ref 0.2–1)
BILIRUB UR QL: NEGATIVE
BUN SERPL-MCNC: 16 MG/DL (ref 7–18)
BUN/CREAT SERPL: 15 (ref 12–20)
CALCIUM SERPL-MCNC: 8.6 MG/DL (ref 8.5–10.1)
CHLORIDE SERPL-SCNC: 103 MMOL/L (ref 100–111)
CHOLEST SERPL-MCNC: 145 MG/DL
CO2 SERPL-SCNC: 29 MMOL/L (ref 21–32)
COLOR UR: YELLOW
CREAT SERPL-MCNC: 1.04 MG/DL (ref 0.6–1.3)
EPITH CASTS URNS QL MICRO: ABNORMAL /LPF (ref 0–5)
EST. AVERAGE GLUCOSE BLD GHB EST-MCNC: 114 MG/DL
GLOBULIN SER CALC-MCNC: 3.6 G/DL (ref 2–4)
GLUCOSE SERPL-MCNC: 102 MG/DL (ref 74–99)
GLUCOSE UR STRIP.AUTO-MCNC: NEGATIVE MG/DL
HBA1C MFR BLD: 5.6 % (ref 4.2–5.6)
HDLC SERPL-MCNC: 46 MG/DL (ref 40–60)
HDLC SERPL: 3.2 (ref 0–5)
HGB UR QL STRIP: NEGATIVE
KETONES UR QL STRIP.AUTO: NEGATIVE MG/DL
LDLC SERPL CALC-MCNC: 75 MG/DL (ref 0–100)
LEUKOCYTE ESTERASE UR QL STRIP.AUTO: NEGATIVE
LIPID PANEL: NORMAL
MUCOUS THREADS URNS QL MICRO: ABNORMAL /LPF
NITRITE UR QL STRIP.AUTO: NEGATIVE
PH UR STRIP: 6.5 (ref 5–8)
POTASSIUM SERPL-SCNC: 3.2 MMOL/L (ref 3.5–5.5)
PROT SERPL-MCNC: 7.1 G/DL (ref 6.4–8.2)
PROT UR STRIP-MCNC: ABNORMAL MG/DL
RBC #/AREA URNS HPF: ABNORMAL /HPF (ref 0–5)
SODIUM SERPL-SCNC: 140 MMOL/L (ref 136–145)
SP GR UR REFRACTOMETRY: 1.02 (ref 1–1.03)
TRIGL SERPL-MCNC: 120 MG/DL
TSH SERPL DL<=0.05 MIU/L-ACNC: 1.79 UIU/ML (ref 0.36–3.74)
UROBILINOGEN UR QL STRIP.AUTO: 0.2 EU/DL (ref 0.2–1)
VLDLC SERPL CALC-MCNC: 24 MG/DL
WBC URNS QL MICRO: ABNORMAL /HPF (ref 0–4)

## 2024-08-20 PROCEDURE — 80061 LIPID PANEL: CPT

## 2024-08-20 PROCEDURE — 36415 COLL VENOUS BLD VENIPUNCTURE: CPT

## 2024-08-20 PROCEDURE — 81001 URINALYSIS AUTO W/SCOPE: CPT

## 2024-08-20 PROCEDURE — 80053 COMPREHEN METABOLIC PANEL: CPT

## 2024-08-20 PROCEDURE — 84443 ASSAY THYROID STIM HORMONE: CPT

## 2024-08-20 PROCEDURE — 83036 HEMOGLOBIN GLYCOSYLATED A1C: CPT

## 2024-08-31 ENCOUNTER — TELEPHONE (OUTPATIENT)
Facility: CLINIC | Age: 69
End: 2024-08-31

## 2024-08-31 RX ORDER — POTASSIUM CHLORIDE 750 MG/1
10 TABLET, EXTENDED RELEASE ORAL DAILY
Qty: 5 TABLET | Refills: 0 | Status: SHIPPED | OUTPATIENT
Start: 2024-08-31 | End: 2024-09-05

## 2024-08-31 ASSESSMENT — ENCOUNTER SYMPTOMS
ABDOMINAL PAIN: 0
GASTROINTESTINAL NEGATIVE: 1
WHEEZING: 0
BACK PAIN: 1
CHEST TIGHTNESS: 0
SHORTNESS OF BREATH: 0
RHINORRHEA: 1

## 2024-08-31 NOTE — TELEPHONE ENCOUNTER
Left voicemail for patient regarding low potassium of 3.2. will send Mychart message regarding potassium prescription.

## 2024-10-01 DIAGNOSIS — E78.2 MIXED HYPERLIPIDEMIA: ICD-10-CM

## 2024-10-01 RX ORDER — SIMVASTATIN 10 MG
10 TABLET ORAL NIGHTLY
Qty: 90 TABLET | Refills: 1 | OUTPATIENT
Start: 2024-10-01

## 2024-10-01 RX ORDER — SIMVASTATIN 10 MG
10 TABLET ORAL NIGHTLY
Qty: 90 TABLET | Refills: 1 | Status: SHIPPED | OUTPATIENT
Start: 2024-10-01

## 2024-10-28 DIAGNOSIS — K21.9 GASTRO-ESOPHAGEAL REFLUX DISEASE WITHOUT ESOPHAGITIS: ICD-10-CM

## 2024-12-19 ENCOUNTER — OFFICE VISIT (OUTPATIENT)
Facility: CLINIC | Age: 69
End: 2024-12-19
Payer: MEDICARE

## 2024-12-19 VITALS
TEMPERATURE: 98.2 F | RESPIRATION RATE: 18 BRPM | SYSTOLIC BLOOD PRESSURE: 126 MMHG | BODY MASS INDEX: 30.46 KG/M2 | HEIGHT: 75 IN | WEIGHT: 245 LBS | OXYGEN SATURATION: 95 % | HEART RATE: 74 BPM | DIASTOLIC BLOOD PRESSURE: 81 MMHG

## 2024-12-19 DIAGNOSIS — T78.40XS ALLERGY, UNSPECIFIED, SEQUELA: ICD-10-CM

## 2024-12-19 DIAGNOSIS — I10 ESSENTIAL (PRIMARY) HYPERTENSION: ICD-10-CM

## 2024-12-19 DIAGNOSIS — E78.2 MIXED HYPERLIPIDEMIA: ICD-10-CM

## 2024-12-19 DIAGNOSIS — Z00.00 MEDICARE ANNUAL WELLNESS VISIT, SUBSEQUENT: Primary | ICD-10-CM

## 2024-12-19 PROCEDURE — G8484 FLU IMMUNIZE NO ADMIN: HCPCS | Performed by: NURSE PRACTITIONER

## 2024-12-19 PROCEDURE — 1160F RVW MEDS BY RX/DR IN RCRD: CPT | Performed by: NURSE PRACTITIONER

## 2024-12-19 PROCEDURE — 1159F MED LIST DOCD IN RCRD: CPT | Performed by: NURSE PRACTITIONER

## 2024-12-19 PROCEDURE — 3017F COLORECTAL CA SCREEN DOC REV: CPT | Performed by: NURSE PRACTITIONER

## 2024-12-19 PROCEDURE — 1126F AMNT PAIN NOTED NONE PRSNT: CPT | Performed by: NURSE PRACTITIONER

## 2024-12-19 PROCEDURE — 1123F ACP DISCUSS/DSCN MKR DOCD: CPT | Performed by: NURSE PRACTITIONER

## 2024-12-19 PROCEDURE — 3079F DIAST BP 80-89 MM HG: CPT | Performed by: NURSE PRACTITIONER

## 2024-12-19 PROCEDURE — G0439 PPPS, SUBSEQ VISIT: HCPCS | Performed by: NURSE PRACTITIONER

## 2024-12-19 PROCEDURE — 3074F SYST BP LT 130 MM HG: CPT | Performed by: NURSE PRACTITIONER

## 2024-12-19 RX ORDER — AMLODIPINE BESYLATE 10 MG/1
10 TABLET ORAL DAILY
Qty: 90 TABLET | Refills: 1 | Status: SHIPPED | OUTPATIENT
Start: 2024-12-19

## 2024-12-19 RX ORDER — SIMVASTATIN 10 MG
10 TABLET ORAL NIGHTLY
Qty: 90 TABLET | Refills: 1 | Status: SHIPPED | OUTPATIENT
Start: 2024-12-19

## 2024-12-19 RX ORDER — FLUTICASONE PROPIONATE 50 MCG
SPRAY, SUSPENSION (ML) NASAL
Qty: 16 G | Refills: 2 | Status: SHIPPED | OUTPATIENT
Start: 2024-12-19

## 2024-12-19 RX ORDER — AZELASTINE 1 MG/ML
2 SPRAY, METERED NASAL 2 TIMES DAILY
Qty: 60 ML | Refills: 5 | Status: SHIPPED | OUTPATIENT
Start: 2024-12-19

## 2024-12-19 RX ORDER — HYDROCHLOROTHIAZIDE 25 MG/1
25 TABLET ORAL DAILY
Qty: 90 TABLET | Refills: 1 | Status: SHIPPED | OUTPATIENT
Start: 2024-12-19

## 2024-12-19 ASSESSMENT — PATIENT HEALTH QUESTIONNAIRE - PHQ9
SUM OF ALL RESPONSES TO PHQ QUESTIONS 1-9: 0
2. FEELING DOWN, DEPRESSED OR HOPELESS: NOT AT ALL
1. LITTLE INTEREST OR PLEASURE IN DOING THINGS: NOT AT ALL
SUM OF ALL RESPONSES TO PHQ9 QUESTIONS 1 & 2: 0
SUM OF ALL RESPONSES TO PHQ QUESTIONS 1-9: 0

## 2024-12-19 NOTE — PROGRESS NOTES
Medicare Annual Wellness Visit    Olvin Maravilla is here for Medicare AWV    Assessment & Plan   Medicare annual wellness visit, subsequent  Essential (primary) hypertension  -     hydroCHLOROthiazide (HYDRODIURIL) 25 MG tablet; Take 1 tablet by mouth daily, Disp-90 tablet, R-1Normal  -     amLODIPine (NORVASC) 10 MG tablet; Take 1 tablet by mouth daily, Disp-90 tablet, R-1Normal  Mixed hyperlipidemia  -     simvastatin (ZOCOR) 10 MG tablet; Take 1 tablet by mouth nightly, Disp-90 tablet, R-1Normal  Allergy, unspecified, sequela  -     azelastine (ASTELIN) 0.1 % nasal spray; 2 sprays by Nasal route 2 times daily Use in each nostril as directed, Disp-60 mL, R-5Normal  -     fluticasone (FLONASE) 50 MCG/ACT nasal spray; Instill 2 sprays in each nostril daily, Disp-16 g, R-2Normal    Recommendations for Preventive Services Due: see orders and patient instructions/AVS.  Recommended screening schedule for the next 5-10 years is provided to the patient in written form: see Patient Instructions/AVS.     Return in about 4 months (around 4/19/2025).     Subjective       Patient's complete Health Risk Assessment and screening values have been reviewed and are found in Flowsheets. The following problems were reviewed today and where indicated follow up appointments were made and/or referrals ordered.    Positive Risk Factor Screenings with Interventions:                Abnormal BMI (obese):  Body mass index is 30.62 kg/m². (!) Abnormal  Interventions:  Patient advised to follow-up in this office for further evaluation and treatment           Advanced Directives:  Do you have a Living Will?: (!) No    Intervention:  has NO advanced directive - information provided            Objective   Vitals:    12/19/24 1432   BP: 126/81   Pulse: 74   Resp: 18   Temp: 98.2 °F (36.8 °C)   TempSrc: Temporal   SpO2: 95%   Weight: 111.1 kg (245 lb)   Height: 1.905 m (6' 3\")      Body mass index is 30.62 kg/m².                    Allergies

## 2025-02-19 DIAGNOSIS — I10 ESSENTIAL (PRIMARY) HYPERTENSION: ICD-10-CM

## 2025-02-19 DIAGNOSIS — T78.40XS ALLERGY, UNSPECIFIED, SEQUELA: ICD-10-CM

## 2025-02-19 RX ORDER — OLOPATADINE HYDROCHLORIDE 665 UG/1
SPRAY NASAL
COMMUNITY
Start: 2025-01-23

## 2025-02-19 RX ORDER — AZELASTINE 1 MG/ML
2 SPRAY, METERED NASAL 2 TIMES DAILY
Qty: 60 ML | Refills: 5 | Status: SHIPPED | OUTPATIENT
Start: 2025-02-19

## 2025-02-19 RX ORDER — AMLODIPINE BESYLATE 10 MG/1
10 TABLET ORAL DAILY
Qty: 90 TABLET | Refills: 1 | Status: SHIPPED | OUTPATIENT
Start: 2025-02-19

## 2025-02-19 RX ORDER — HYDROCHLOROTHIAZIDE 25 MG/1
25 TABLET ORAL DAILY
Qty: 90 TABLET | Refills: 1 | Status: SHIPPED | OUTPATIENT
Start: 2025-02-19

## 2025-02-19 RX ORDER — AMLODIPINE BESYLATE 10 MG/1
10 TABLET ORAL DAILY
Qty: 90 TABLET | Refills: 1 | OUTPATIENT
Start: 2025-02-19

## 2025-03-18 RX ORDER — MONTELUKAST SODIUM 10 MG/1
10 TABLET ORAL DAILY
Qty: 90 TABLET | Refills: 1 | Status: SHIPPED | OUTPATIENT
Start: 2025-03-18

## 2025-04-21 ENCOUNTER — OFFICE VISIT (OUTPATIENT)
Facility: CLINIC | Age: 70
End: 2025-04-21
Payer: MEDICARE

## 2025-04-21 VITALS
DIASTOLIC BLOOD PRESSURE: 78 MMHG | RESPIRATION RATE: 16 BRPM | WEIGHT: 249.6 LBS | OXYGEN SATURATION: 93 % | HEIGHT: 75 IN | TEMPERATURE: 97.7 F | HEART RATE: 92 BPM | SYSTOLIC BLOOD PRESSURE: 123 MMHG | BODY MASS INDEX: 31.04 KG/M2

## 2025-04-21 DIAGNOSIS — K21.9 GASTRO-ESOPHAGEAL REFLUX DISEASE WITHOUT ESOPHAGITIS: ICD-10-CM

## 2025-04-21 DIAGNOSIS — J31.0 CHRONIC RHINITIS: Primary | ICD-10-CM

## 2025-04-21 DIAGNOSIS — J45.50 SEVERE PERSISTENT ASTHMA WITHOUT COMPLICATION (HCC): ICD-10-CM

## 2025-04-21 DIAGNOSIS — E78.2 MIXED HYPERLIPIDEMIA: ICD-10-CM

## 2025-04-21 DIAGNOSIS — I10 ESSENTIAL (PRIMARY) HYPERTENSION: ICD-10-CM

## 2025-04-21 PROCEDURE — 3074F SYST BP LT 130 MM HG: CPT | Performed by: NURSE PRACTITIONER

## 2025-04-21 PROCEDURE — G8417 CALC BMI ABV UP PARAM F/U: HCPCS | Performed by: NURSE PRACTITIONER

## 2025-04-21 PROCEDURE — 99204 OFFICE O/P NEW MOD 45 MIN: CPT | Performed by: NURSE PRACTITIONER

## 2025-04-21 PROCEDURE — 3017F COLORECTAL CA SCREEN DOC REV: CPT | Performed by: NURSE PRACTITIONER

## 2025-04-21 PROCEDURE — 1036F TOBACCO NON-USER: CPT | Performed by: NURSE PRACTITIONER

## 2025-04-21 PROCEDURE — G8427 DOCREV CUR MEDS BY ELIG CLIN: HCPCS | Performed by: NURSE PRACTITIONER

## 2025-04-21 PROCEDURE — 1123F ACP DISCUSS/DSCN MKR DOCD: CPT | Performed by: NURSE PRACTITIONER

## 2025-04-21 PROCEDURE — 3078F DIAST BP <80 MM HG: CPT | Performed by: NURSE PRACTITIONER

## 2025-04-21 RX ORDER — SIMVASTATIN 10 MG
10 TABLET ORAL NIGHTLY
Qty: 90 TABLET | Refills: 1 | Status: SHIPPED | OUTPATIENT
Start: 2025-04-21

## 2025-04-21 SDOH — ECONOMIC STABILITY: FOOD INSECURITY: WITHIN THE PAST 12 MONTHS, YOU WORRIED THAT YOUR FOOD WOULD RUN OUT BEFORE YOU GOT MONEY TO BUY MORE.: NEVER TRUE

## 2025-04-21 SDOH — ECONOMIC STABILITY: FOOD INSECURITY: WITHIN THE PAST 12 MONTHS, THE FOOD YOU BOUGHT JUST DIDN'T LAST AND YOU DIDN'T HAVE MONEY TO GET MORE.: NEVER TRUE

## 2025-04-21 ASSESSMENT — ENCOUNTER SYMPTOMS
EYE PAIN: 0
EYE ITCHING: 0
GASTROINTESTINAL NEGATIVE: 1
CHEST TIGHTNESS: 0
SINUS PRESSURE: 0
RHINORRHEA: 1
EYE DISCHARGE: 0
SINUS PAIN: 0
WHEEZING: 0
ABDOMINAL PAIN: 0
EYE REDNESS: 0
SHORTNESS OF BREATH: 0
BACK PAIN: 1

## 2025-04-21 ASSESSMENT — PATIENT HEALTH QUESTIONNAIRE - PHQ9
SUM OF ALL RESPONSES TO PHQ QUESTIONS 1-9: 0
SUM OF ALL RESPONSES TO PHQ QUESTIONS 1-9: 0
1. LITTLE INTEREST OR PLEASURE IN DOING THINGS: NOT AT ALL
SUM OF ALL RESPONSES TO PHQ QUESTIONS 1-9: 0
SUM OF ALL RESPONSES TO PHQ QUESTIONS 1-9: 0
2. FEELING DOWN, DEPRESSED OR HOPELESS: NOT AT ALL

## 2025-04-21 NOTE — PROGRESS NOTES
Olvin Maravilla presents today for   Chief Complaint   Patient presents with    Follow-up    Discuss Medications     Spiriva inhaler not the capsule        Is someone accompanying this pt? No     Is the patient using any DME equipment during OV? No         4/21/2025     1:20 PM   PHQ-9    Little interest or pleasure in doing things 0   Feeling down, depressed, or hopeless 0   PHQ-2 Score 0   PHQ-9 Total Score 0            Health Maintenance reviewed and discussed and ordered per Provider.    Health Maintenance Due   Topic Date Due    Colorectal Cancer Screen  08/06/2020    COVID-19 Vaccine (7 - 2024-25 season) 12/30/2024   .      \"Have you been to the ER, urgent care clinic since your last visit?  Hospitalized since your last visit?\"    No     “Have you seen or consulted any other health care providers outside our system since your last visit?”    No       “Have you had a colorectal cancer screening such as a colonoscopy/FIT/Cologuard?    No     Date of last Colonoscopy: 8/6/2015  No cologuard on file  No FIT/FOBT on file   No flexible sigmoidoscopy on file

## 2025-04-21 NOTE — PROGRESS NOTES
Olvin Maravilla is a 70 y.o. male and presents with Follow-up and Discuss Medications (Spiriva inhaler not the capsule )       Assessment/Plan:    1. Chronic rhinitis  2. Gastro-esophageal reflux disease without esophagitis  Assessment & Plan:   Chronic, at goal (stable), continue current treatment plan  3. Essential (primary) hypertension  Assessment & Plan:   Chronic, at goal (stable), continue current treatment plan and medication adherence emphasized  4. Severe persistent asthma without complication (HCC)  Assessment & Plan:   Chronic, at goal (stable), continue current treatment plan and medication adherence emphasized  Orders:  -     tiotropium (SPIRIVA RESPIMAT) 1.25 MCG/ACT AERS inhaler; Inhale 2 puffs into the lungs daily, Disp-4 g, R-2Normal  5. Mixed hyperlipidemia  -     simvastatin (ZOCOR) 10 MG tablet; Take 1 tablet by mouth nightly, Disp-90 tablet, R-1Normal         Follow up and disposition:   Return in about 8 months (around 12/21/2025).      Subjective:    Labs obtained prior to visit? No  Reviewed with patient? N/A    Patient states will schedule colonoscopy this fall     ROS:     Review of Systems   Constitutional:  Negative for chills and fatigue.   HENT:  Positive for postnasal drip and rhinorrhea. Negative for sinus pressure and sinus pain.    Eyes:  Negative for pain, discharge, redness and itching.   Respiratory:  Negative for chest tightness, shortness of breath and wheezing.    Cardiovascular:  Negative for chest pain, palpitations and leg swelling.   Gastrointestinal: Negative.  Negative for abdominal pain.   Genitourinary: Negative.    Musculoskeletal:  Positive for back pain.   Skin: Negative.    Allergic/Immunologic: Positive for environmental allergies.   Neurological: Negative.  Negative for dizziness and headaches.   Psychiatric/Behavioral: Negative.  Negative for dysphoric mood, sleep disturbance and suicidal ideas. The patient is not nervous/anxious.          The problem list was

## 2025-04-22 DIAGNOSIS — K21.9 GASTRO-ESOPHAGEAL REFLUX DISEASE WITHOUT ESOPHAGITIS: ICD-10-CM

## 2025-04-22 RX ORDER — OMEPRAZOLE 20 MG/1
20 CAPSULE, DELAYED RELEASE ORAL DAILY
Qty: 90 CAPSULE | Refills: 1 | Status: SHIPPED | OUTPATIENT
Start: 2025-04-22

## 2025-04-26 PROBLEM — R63.5 WEIGHT GAIN: Status: RESOLVED | Noted: 2023-05-15 | Resolved: 2025-04-26

## 2025-04-26 PROBLEM — E66.09 CLASS 1 OBESITY DUE TO EXCESS CALORIES WITH SERIOUS COMORBIDITY AND BODY MASS INDEX (BMI) OF 32.0 TO 32.9 IN ADULT: Status: RESOLVED | Noted: 2023-05-15 | Resolved: 2025-04-26

## 2025-04-26 PROBLEM — T78.40XS ALLERGY, UNSPECIFIED, SEQUELA: Status: RESOLVED | Noted: 2023-01-31 | Resolved: 2025-04-26

## 2025-04-26 PROBLEM — J45.20 MILD INTERMITTENT ASTHMA WITHOUT COMPLICATION: Status: RESOLVED | Noted: 2017-05-14 | Resolved: 2025-04-26

## 2025-04-26 PROBLEM — E66.811 CLASS 1 OBESITY DUE TO EXCESS CALORIES WITH SERIOUS COMORBIDITY AND BODY MASS INDEX (BMI) OF 32.0 TO 32.9 IN ADULT: Status: RESOLVED | Noted: 2023-05-15 | Resolved: 2025-04-26

## 2025-04-26 PROBLEM — M54.41 ACUTE RIGHT-SIDED LOW BACK PAIN WITH RIGHT-SIDED SCIATICA: Status: RESOLVED | Noted: 2023-01-31 | Resolved: 2025-04-26

## 2025-04-26 PROBLEM — R06.09 DOE (DYSPNEA ON EXERTION): Status: RESOLVED | Noted: 2021-07-09 | Resolved: 2025-04-26

## 2025-04-29 DIAGNOSIS — J45.50 SEVERE PERSISTENT ASTHMA WITHOUT COMPLICATION (HCC): Primary | ICD-10-CM

## 2025-04-29 DIAGNOSIS — J45.50 SEVERE PERSISTENT ASTHMA WITHOUT COMPLICATION (HCC): ICD-10-CM

## 2025-05-17 DIAGNOSIS — K21.9 GASTRO-ESOPHAGEAL REFLUX DISEASE WITHOUT ESOPHAGITIS: ICD-10-CM

## 2025-05-18 DIAGNOSIS — J45.50 SEVERE PERSISTENT ASTHMA WITHOUT COMPLICATION (HCC): ICD-10-CM

## 2025-05-18 DIAGNOSIS — I10 ESSENTIAL (PRIMARY) HYPERTENSION: ICD-10-CM

## 2025-05-18 RX ORDER — OMEPRAZOLE 20 MG/1
20 CAPSULE, DELAYED RELEASE ORAL DAILY
Qty: 90 CAPSULE | Refills: 1 | Status: SHIPPED | OUTPATIENT
Start: 2025-05-18

## 2025-05-18 RX ORDER — HYDROCHLOROTHIAZIDE 25 MG/1
25 TABLET ORAL DAILY
Qty: 90 TABLET | Refills: 1 | Status: SHIPPED | OUTPATIENT
Start: 2025-05-18

## 2025-05-18 RX ORDER — AMLODIPINE BESYLATE 10 MG/1
10 TABLET ORAL DAILY
Qty: 90 TABLET | Refills: 1 | Status: SHIPPED | OUTPATIENT
Start: 2025-05-18

## 2025-05-18 RX ORDER — ALBUTEROL SULFATE 90 UG/1
1 INHALANT RESPIRATORY (INHALATION) DAILY
Qty: 18 G | Refills: 2 | Status: SHIPPED | OUTPATIENT
Start: 2025-05-18

## 2025-06-12 DIAGNOSIS — E66.09 CLASS 1 OBESITY DUE TO EXCESS CALORIES WITH SERIOUS COMORBIDITY AND BODY MASS INDEX (BMI) OF 32.0 TO 32.9 IN ADULT: Primary | ICD-10-CM

## 2025-06-12 DIAGNOSIS — E66.811 CLASS 1 OBESITY DUE TO EXCESS CALORIES WITH SERIOUS COMORBIDITY AND BODY MASS INDEX (BMI) OF 32.0 TO 32.9 IN ADULT: Primary | ICD-10-CM

## 2025-06-16 ENCOUNTER — TELEPHONE (OUTPATIENT)
Facility: CLINIC | Age: 70
End: 2025-06-16

## 2025-06-16 DIAGNOSIS — T78.40XS ALLERGY, UNSPECIFIED, SEQUELA: ICD-10-CM

## 2025-06-16 RX ORDER — FLUTICASONE PROPIONATE 50 MCG
SPRAY, SUSPENSION (ML) NASAL
Qty: 18 G | Refills: 0 | Status: SHIPPED | OUTPATIENT
Start: 2025-06-16 | End: 2025-06-17 | Stop reason: SDUPTHER

## 2025-06-17 DIAGNOSIS — T78.40XS ALLERGY, UNSPECIFIED, SEQUELA: ICD-10-CM

## 2025-06-17 RX ORDER — FLUTICASONE PROPIONATE 50 MCG
2 SPRAY, SUSPENSION (ML) NASAL DAILY PRN
Qty: 18 G | Refills: 0 | Status: SHIPPED | OUTPATIENT
Start: 2025-06-17

## 2025-06-24 RX ORDER — MONTELUKAST SODIUM 10 MG/1
10 TABLET ORAL DAILY
Qty: 90 TABLET | Refills: 1 | Status: SHIPPED | OUTPATIENT
Start: 2025-06-24

## 2025-06-29 DIAGNOSIS — E66.09 CLASS 1 OBESITY DUE TO EXCESS CALORIES WITH SERIOUS COMORBIDITY AND BODY MASS INDEX (BMI) OF 32.0 TO 32.9 IN ADULT: ICD-10-CM

## 2025-06-29 DIAGNOSIS — E66.811 CLASS 1 OBESITY DUE TO EXCESS CALORIES WITH SERIOUS COMORBIDITY AND BODY MASS INDEX (BMI) OF 32.0 TO 32.9 IN ADULT: ICD-10-CM

## 2025-07-07 DIAGNOSIS — J45.50 SEVERE PERSISTENT ASTHMA WITHOUT COMPLICATION (HCC): ICD-10-CM

## 2025-07-07 DIAGNOSIS — E78.2 MIXED HYPERLIPIDEMIA: ICD-10-CM

## 2025-07-07 RX ORDER — SIMVASTATIN 10 MG
10 TABLET ORAL NIGHTLY
Qty: 100 TABLET | Refills: 2 | Status: SHIPPED | OUTPATIENT
Start: 2025-07-07

## 2025-07-07 RX ORDER — TIOTROPIUM BROMIDE INHALATION SPRAY 3.12 UG/1
SPRAY, METERED RESPIRATORY (INHALATION)
Qty: 12 G | Refills: 3 | Status: SHIPPED | OUTPATIENT
Start: 2025-07-07

## 2025-07-11 ENCOUNTER — CLINICAL DOCUMENTATION (OUTPATIENT)
Facility: CLINIC | Age: 70
End: 2025-07-11

## 2025-07-11 NOTE — PROGRESS NOTES
This provided called the prior authorization line for [employer] to appeal the denial of the Wegovy.   This provider placed on hold for almost 20 minutes with no response by appeals MD. Advised  to re-send denial letter so can complete appeals via fax.

## 2025-07-16 DIAGNOSIS — E66.811 CLASS 1 OBESITY DUE TO EXCESS CALORIES WITH SERIOUS COMORBIDITY AND BODY MASS INDEX (BMI) OF 32.0 TO 32.9 IN ADULT: Primary | ICD-10-CM

## 2025-07-16 DIAGNOSIS — E66.09 CLASS 1 OBESITY DUE TO EXCESS CALORIES WITH SERIOUS COMORBIDITY AND BODY MASS INDEX (BMI) OF 32.0 TO 32.9 IN ADULT: Primary | ICD-10-CM

## 2025-07-16 NOTE — PROGRESS NOTES
Received denial letter from [employer] for Wegovy and since patient has hypertension and appetite suppressants are contraindicated, insurance requesting patient try saxenda first.     Gabriel, medical assistant called patient to tell him that we will try Saxenda instead of wegovy per insurance requirements.

## 2025-07-20 DIAGNOSIS — T78.40XS ALLERGY, UNSPECIFIED, SEQUELA: ICD-10-CM

## 2025-07-21 DIAGNOSIS — E66.811 CLASS 1 OBESITY DUE TO EXCESS CALORIES WITH SERIOUS COMORBIDITY AND BODY MASS INDEX (BMI) OF 32.0 TO 32.9 IN ADULT: ICD-10-CM

## 2025-07-21 DIAGNOSIS — E66.09 CLASS 1 OBESITY DUE TO EXCESS CALORIES WITH SERIOUS COMORBIDITY AND BODY MASS INDEX (BMI) OF 32.0 TO 32.9 IN ADULT: ICD-10-CM

## 2025-07-21 RX ORDER — FLUTICASONE PROPIONATE 50 MCG
SPRAY, SUSPENSION (ML) NASAL
Qty: 16 G | Refills: 2 | Status: SHIPPED | OUTPATIENT
Start: 2025-07-21

## 2025-07-21 NOTE — TELEPHONE ENCOUNTER
PA requested for Saxenda per patient's request since has not received medication from mail order. Pt states that the pharmacy told him PA needs to be completed.

## 2025-08-25 DIAGNOSIS — I10 ESSENTIAL (PRIMARY) HYPERTENSION: ICD-10-CM

## 2025-08-25 DIAGNOSIS — K21.9 GASTRO-ESOPHAGEAL REFLUX DISEASE WITHOUT ESOPHAGITIS: ICD-10-CM

## 2025-08-26 RX ORDER — HYDROCHLOROTHIAZIDE 25 MG/1
25 TABLET ORAL DAILY
Qty: 100 TABLET | Refills: 2 | Status: SHIPPED | OUTPATIENT
Start: 2025-08-26

## 2025-08-26 RX ORDER — OMEPRAZOLE 20 MG/1
20 CAPSULE, DELAYED RELEASE ORAL DAILY
Qty: 100 CAPSULE | Refills: 2 | Status: SHIPPED | OUTPATIENT
Start: 2025-08-26

## 2025-08-26 RX ORDER — AMLODIPINE BESYLATE 10 MG/1
10 TABLET ORAL DAILY
Qty: 100 TABLET | Refills: 2 | Status: SHIPPED | OUTPATIENT
Start: 2025-08-26

## 2025-09-02 RX ORDER — MONTELUKAST SODIUM 10 MG/1
10 TABLET ORAL DAILY
Qty: 100 TABLET | Refills: 2 | Status: SHIPPED | OUTPATIENT
Start: 2025-09-02